# Patient Record
Sex: MALE | Race: BLACK OR AFRICAN AMERICAN | NOT HISPANIC OR LATINO | Employment: FULL TIME | URBAN - METROPOLITAN AREA
[De-identification: names, ages, dates, MRNs, and addresses within clinical notes are randomized per-mention and may not be internally consistent; named-entity substitution may affect disease eponyms.]

---

## 2018-06-19 ENCOUNTER — OFFICE VISIT (OUTPATIENT)
Dept: INTERNAL MEDICINE | Facility: CLINIC | Age: 48
End: 2018-06-19
Payer: COMMERCIAL

## 2018-06-19 VITALS
DIASTOLIC BLOOD PRESSURE: 78 MMHG | BODY MASS INDEX: 35.41 KG/M2 | WEIGHT: 261.44 LBS | HEIGHT: 72 IN | SYSTOLIC BLOOD PRESSURE: 110 MMHG | HEART RATE: 74 BPM

## 2018-06-19 DIAGNOSIS — M79.604 BILATERAL LOWER EXTREMITY PAIN: Primary | ICD-10-CM

## 2018-06-19 DIAGNOSIS — M79.605 BILATERAL LOWER EXTREMITY PAIN: Primary | ICD-10-CM

## 2018-06-19 PROCEDURE — 99213 OFFICE O/P EST LOW 20 MIN: CPT | Mod: S$GLB,,, | Performed by: PHYSICIAN ASSISTANT

## 2018-06-19 PROCEDURE — 3008F BODY MASS INDEX DOCD: CPT | Mod: CPTII,S$GLB,, | Performed by: PHYSICIAN ASSISTANT

## 2018-06-19 PROCEDURE — 99999 PR PBB SHADOW E&M-EST. PATIENT-LVL IV: CPT | Mod: PBBFAC,,, | Performed by: PHYSICIAN ASSISTANT

## 2018-06-19 NOTE — PATIENT INSTRUCTIONS
Myalgias  Myalgias are another word for muscle aches and soreness. This is a symptom, not a disease. Myalgias can have many causes. A cold, the flu, or an acute infection can cause them. So can any illness with a high fever. They may happen after exertion (such as heavy exercise) or injury (such as an accident or fall). Some medicines (such as statins and certain antidepressants) can cause myalgias. They can also be a symptom of chronic or ongoing medical problems (such as lupus, chronic fatigue, or hypothyroidism). With these illnesses, other serious symptoms often occur in addition to muscle pain and soreness.    Myalgias most often go away on their own. If they don't go away, come back, or are severe, testing may be needed to help find the cause.  Home care  · Rest until you feel better.  · Follow instructions that you were given for how to care for yourself. This may depend on the cause of your myalgias.   · If myalgia is thought to be due to a medicine, be sure to talk to the doctor that prescribed the medicine about the best course of action.  · To control pain, take prescription or over-the-counter medicines as directed. Unless told not to, you can try acetaminophen or ibuprofen.  Follow-up care  Follow up with your healthcare provider or as advised. If your symptoms do not go away in a few days or if they come back, follow up with your healthcare provider for an exam and testing.  When to see medical advice  Call your healthcare provider for any of the following:  · Fever of 100.4°F (38ºC) or higher, or as directed by your healthcare provider  · Pain that gets worse and not better, or that goes away and comes back  · New joint pains  · New rash  · Severe headache, neck pain, drowsiness, or confusion  Date Last Reviewed: 3/1/2017  © 7329-4935 [employer]. [address] [place] [address], [place] [address]. All rights reserved. This information is not intended as a substitute for professional medical  care. Always follow your healthcare professional's instructions.

## 2018-06-19 NOTE — PROGRESS NOTES
Subjective:       Patient ID: Barthelemy Jolly is a 47 y.o. male.        Chief Complaint: Foot Pain (both pain=8); Leg Pain (both pain=8); and Headache    Barthelemy Jolly is an established patient of [facility] here today for urgent care visit.    Bilateral lower extremity pain x 2 years or more, occurs most days, worsened pain over the past 6 months, typically 6-7/10, at worst 8-9/10, at best 4-5/10, pain never completely resolves, notes that he used to have back pain but not much of that anymore, no numbness or tingling, no weakness in the legs, pain all the way from thighs to lower legs and feet, pain gets worse after sitting and then tries to get up, eases once he gets up and moves around a little.  Last visit here was for same issue in 2016.  Had unremarkable lumbar x-ray and normal MILTON.      No abdominal pain.  No N/V/D/C.  No urinary sx.      Occasional headaches, no numbness/tingling/blurred vision/weakness.             Review of Systems   Constitutional: Negative for appetite change, chills, fatigue and fever.   HENT: Negative for congestion and sore throat.    Eyes: Negative for visual disturbance.   Respiratory: Negative for cough, chest tightness and shortness of breath.    Cardiovascular: Negative for chest pain, palpitations and leg swelling.   Gastrointestinal: Negative for abdominal pain, blood in stool, constipation, diarrhea, nausea and vomiting.   Genitourinary: Negative for dysuria, frequency, hematuria and urgency.   Musculoskeletal: Positive for arthralgias. Negative for back pain.   Skin: Negative for rash.   Neurological: Positive for headaches. Negative for dizziness, syncope and weakness.   Psychiatric/Behavioral: Negative for dysphoric mood and sleep disturbance. The patient is not nervous/anxious.        Objective:      Physical Exam   Constitutional: He appears well-developed and well-nourished. No distress.   HENT:   Head: Normocephalic and atraumatic.   Right Ear: Tympanic  membrane, external ear and ear canal normal.   Left Ear: Tympanic membrane, external ear and ear canal normal.   Nose: Nose normal.   Mouth/Throat: Oropharynx is clear and moist.   Eyes: Conjunctivae are normal. Pupils are equal, round, and reactive to light.   Cardiovascular: Normal rate, regular rhythm and normal heart sounds.  Exam reveals no gallop and no friction rub.    No murmur heard.  Pulmonary/Chest: Effort normal and breath sounds normal. No respiratory distress.   Abdominal: Soft. There is no tenderness. There is no rebound and no guarding.   Musculoskeletal: He exhibits no edema.        Lumbar back: He exhibits normal range of motion, no tenderness, no spasm and normal pulse.        Right upper leg: He exhibits no tenderness and no edema.        Left upper leg: He exhibits no tenderness and no edema.        Right lower leg: He exhibits no tenderness and no edema.        Left lower leg: He exhibits no tenderness and no edema.        Right foot: There is normal range of motion, no tenderness, no swelling and normal capillary refill.        Left foot: There is normal range of motion, no tenderness, no swelling and normal capillary refill.   Neurological: He is alert.   Skin: Skin is warm and dry. He is not diaphoretic.   Psychiatric: He has a normal mood and affect.   Nursing note and vitals reviewed.      Assessment:       1. Bilateral lower extremity pain        Plan:       Barthelemy was seen today for foot pain, leg pain and headache.    Diagnoses and all orders for this visit:    Bilateral lower extremity pain  -     Sedimentation rate; Future  -     C-reactive protein; Future  -     Rheumatoid factor; Future  -     ULYSSES; Future  -     Comprehensive metabolic panel; Future  -     TSH; Future  -     CK; Future  -     Ambulatory consult to Rheumatology    Discussed importance of establishing care with a PCP for ongoing evaluation of his BLE pain as this has been ongoing x 2 years and now worsening.   "Check labs as above, consult to rheum, and schedule appointment to establish care with PCP.      Pt has been given instructions populated from [employer] database and has verbalized understanding of the after visit summary and information contained wherein.    Follow up with a primary care provider. May go to ER for acute shortness of breath, lightheadedness, fever, or any other emergent complaints or changes in condition.    "This note will be shared with the patient"    Future Appointments  Date Time Provider Department Center   6/20/2018 1:30 PM Arabella Guardado MD [facility] IM [facility]   7/16/2018 1:00 PM Anthony De La Fuente MD [facility] RHEUM [facility]               "

## 2018-06-20 ENCOUNTER — OFFICE VISIT (OUTPATIENT)
Dept: INTERNAL MEDICINE | Facility: CLINIC | Age: 48
End: 2018-06-20
Payer: COMMERCIAL

## 2018-06-20 DIAGNOSIS — M54.5 LOW BACK PAIN, UNSPECIFIED BACK PAIN LATERALITY, UNSPECIFIED CHRONICITY, WITH SCIATICA PRESENCE UNSPECIFIED: ICD-10-CM

## 2018-06-20 DIAGNOSIS — Z13.89 SCREENING FOR MULTIPLE CONDITIONS: Primary | ICD-10-CM

## 2018-06-20 PROCEDURE — 99213 OFFICE O/P EST LOW 20 MIN: CPT | Mod: S$GLB,,, | Performed by: INTERNAL MEDICINE

## 2018-06-20 PROCEDURE — 3008F BODY MASS INDEX DOCD: CPT | Mod: CPTII,S$GLB,, | Performed by: INTERNAL MEDICINE

## 2018-06-20 PROCEDURE — 99999 PR PBB SHADOW E&M-EST. PATIENT-LVL III: CPT | Mod: PBBFAC,,, | Performed by: INTERNAL MEDICINE

## 2018-06-21 ENCOUNTER — HOSPITAL ENCOUNTER (OUTPATIENT)
Dept: RADIOLOGY | Facility: HOSPITAL | Age: 48
Discharge: HOME | End: 2018-06-21
Attending: INTERNAL MEDICINE
Payer: COMMERCIAL

## 2018-06-21 DIAGNOSIS — M54.5 LOW BACK PAIN, UNSPECIFIED BACK PAIN LATERALITY, UNSPECIFIED CHRONICITY, WITH SCIATICA PRESENCE UNSPECIFIED: ICD-10-CM

## 2018-06-21 PROCEDURE — 72148 MRI LUMBAR SPINE W/O DYE: CPT | Mod: 26,,, | Performed by: RADIOLOGY

## 2018-06-21 PROCEDURE — 72148 MRI LUMBAR SPINE W/O DYE: CPT | Mod: TC

## 2018-06-24 VITALS
SYSTOLIC BLOOD PRESSURE: 120 MMHG | OXYGEN SATURATION: 97 % | HEIGHT: 72 IN | BODY MASS INDEX: 35.21 KG/M2 | TEMPERATURE: 98 F | HEART RATE: 83 BPM | WEIGHT: 260 LBS | DIASTOLIC BLOOD PRESSURE: 72 MMHG

## 2018-06-24 NOTE — PROGRESS NOTES
Subjective:       Patient ID: Barthelemy Jolly is a 47 y.o. male.    Chief Complaint: Back Pain    HPI  He complains of lower back pain.  Denies any acute trauma.  No numbness, no weakness.  Pain has been present for over 6 weeks  Review of Systems   Constitutional: Negative for chills, fatigue, fever and unexpected weight change.   Respiratory: Negative for chest tightness and shortness of breath.    Cardiovascular: Negative for chest pain and palpitations.   Gastrointestinal: Negative for abdominal pain and blood in stool.   Neurological: Negative for dizziness, syncope, numbness and headaches.       Objective:      Physical Exam   HENT:   Right Ear: External ear normal.   Left Ear: External ear normal.   Nose: Nose normal.   Mouth/Throat: Oropharynx is clear and moist.   Eyes: Pupils are equal, round, and reactive to light.   Neck: Normal range of motion.   Cardiovascular: Normal rate and regular rhythm.    No murmur heard.  Pulmonary/Chest: Breath sounds normal.   Abdominal: He exhibits no distension. There is no hepatomegaly. There is no tenderness.   Lymphadenopathy:     He has no cervical adenopathy.     He has no axillary adenopathy.   Neurological: He has normal strength and normal reflexes. No cranial nerve deficit or sensory deficit.     lumbar spine without tenderness or redness  Assessment/Plan       Assessment and plan:  Lower back pain:  Schedule lumbar spine MRI.  Check lipid panel, CBC, PSA

## 2020-08-28 ENCOUNTER — OFFICE VISIT (OUTPATIENT)
Dept: INTERNAL MEDICINE | Facility: CLINIC | Age: 50
End: 2020-08-28
Payer: COMMERCIAL

## 2020-08-28 VITALS
SYSTOLIC BLOOD PRESSURE: 118 MMHG | WEIGHT: 273.38 LBS | DIASTOLIC BLOOD PRESSURE: 80 MMHG | HEIGHT: 72 IN | HEART RATE: 65 BPM | OXYGEN SATURATION: 97 % | BODY MASS INDEX: 37.03 KG/M2

## 2020-08-28 DIAGNOSIS — M25.562 CHRONIC PAIN OF BOTH KNEES: Primary | ICD-10-CM

## 2020-08-28 DIAGNOSIS — G89.29 CHRONIC PAIN OF BOTH KNEES: Primary | ICD-10-CM

## 2020-08-28 DIAGNOSIS — M54.12 CERVICAL RADICULOPATHY AT C5: ICD-10-CM

## 2020-08-28 DIAGNOSIS — M25.561 CHRONIC PAIN OF BOTH KNEES: Primary | ICD-10-CM

## 2020-08-28 PROCEDURE — 3008F BODY MASS INDEX DOCD: CPT | Mod: CPTII,S$GLB,, | Performed by: INTERNAL MEDICINE

## 2020-08-28 PROCEDURE — 3008F PR BODY MASS INDEX (BMI) DOCUMENTED: ICD-10-PCS | Mod: CPTII,S$GLB,, | Performed by: INTERNAL MEDICINE

## 2020-08-28 PROCEDURE — 99213 OFFICE O/P EST LOW 20 MIN: CPT | Mod: S$GLB,,, | Performed by: INTERNAL MEDICINE

## 2020-08-28 PROCEDURE — 99999 PR PBB SHADOW E&M-EST. PATIENT-LVL IV: CPT | Mod: PBBFAC,,, | Performed by: INTERNAL MEDICINE

## 2020-08-28 PROCEDURE — 99213 PR OFFICE/OUTPT VISIT, EST, LEVL III, 20-29 MIN: ICD-10-PCS | Mod: S$GLB,,, | Performed by: INTERNAL MEDICINE

## 2020-08-28 PROCEDURE — 99999 PR PBB SHADOW E&M-EST. PATIENT-LVL IV: ICD-10-PCS | Mod: PBBFAC,,, | Performed by: INTERNAL MEDICINE

## 2020-08-28 RX ORDER — MELOXICAM 15 MG/1
15 TABLET ORAL DAILY
Qty: 30 TABLET | Refills: 1 | Status: SHIPPED | OUTPATIENT
Start: 2020-08-28 | End: 2021-03-16

## 2020-08-28 NOTE — PROGRESS NOTES
Subjective:       Patient ID: Barthelemy Jolly is a 50 y.o. male.    Chief Complaint: Knee Pain and Arm Pain    50 year old former  complains of increasing pain in both knees and a tingling in his right arm that wakes him from sleep.  When awake the arm is totally normal in sensation, strength and ROM    Review of Systems   Constitutional: Negative for activity change, appetite change and fever.   HENT: Negative for congestion, postnasal drip and sore throat.    Respiratory: Negative for cough, shortness of breath and wheezing.    Cardiovascular: Negative for chest pain and palpitations.   Gastrointestinal: Negative for abdominal pain, blood in stool, constipation, diarrhea, nausea and vomiting.   Genitourinary: Negative for decreased urine volume, difficulty urinating, flank pain and frequency.   Musculoskeletal: Negative for arthralgias.   Neurological: Negative for dizziness, weakness and headaches.       Objective:      Physical Exam  Musculoskeletal:      Right shoulder: He exhibits normal range of motion, no tenderness, no deformity, no pain, no spasm and normal strength.      Right knee: He exhibits normal range of motion, no swelling, no effusion, no ecchymosis, no deformity, no laceration, normal alignment and no LCL laxity. No tenderness found.      Left knee: He exhibits normal range of motion, no swelling, no effusion, no deformity, no LCL laxity and no MCL laxity. No tenderness found.         Assessment:       1. Chronic pain of both knees    2. Cervical radiculopathy at C5        Plan:   Barthelemy was seen today for knee pain and arm pain.    Diagnoses and all orders for this visit:    Chronic pain of both knees  -     Ambulatory referral to Orthopedics; Future    Cervical radiculopathy at C5  -     Ambulatory referral/consult to [facility]; Future    Other orders  -     meloxicam (MOBIC) 15 MG tablet; Take 1 tablet (15 mg total) by mouth once daily.

## 2020-08-31 ENCOUNTER — HOSPITAL ENCOUNTER (OUTPATIENT)
Dept: RADIOLOGY | Facility: HOSPITAL | Age: 50
Discharge: HOME | End: 2020-08-31
Attending: PHYSICIAN ASSISTANT
Payer: COMMERCIAL

## 2020-08-31 ENCOUNTER — OFFICE VISIT (OUTPATIENT)
Dept: ORTHOPEDICS | Facility: CLINIC | Age: 50
End: 2020-08-31
Payer: COMMERCIAL

## 2020-08-31 VITALS
SYSTOLIC BLOOD PRESSURE: 118 MMHG | BODY MASS INDEX: 37.52 KG/M2 | DIASTOLIC BLOOD PRESSURE: 77 MMHG | HEART RATE: 73 BPM | WEIGHT: 277 LBS | TEMPERATURE: 98 F | HEIGHT: 72 IN

## 2020-08-31 DIAGNOSIS — M25.561 PAIN IN BOTH KNEES, UNSPECIFIED CHRONICITY: ICD-10-CM

## 2020-08-31 DIAGNOSIS — M25.562 PAIN IN BOTH KNEES, UNSPECIFIED CHRONICITY: Primary | ICD-10-CM

## 2020-08-31 DIAGNOSIS — M25.561 CHRONIC PAIN OF BOTH KNEES: ICD-10-CM

## 2020-08-31 DIAGNOSIS — M25.562 PAIN IN BOTH KNEES, UNSPECIFIED CHRONICITY: ICD-10-CM

## 2020-08-31 DIAGNOSIS — G89.29 CHRONIC PAIN OF BOTH KNEES: ICD-10-CM

## 2020-08-31 DIAGNOSIS — M25.562 CHRONIC PAIN OF BOTH KNEES: ICD-10-CM

## 2020-08-31 DIAGNOSIS — M17.11 OSTEOARTHRITIS OF RIGHT KNEE, UNSPECIFIED OSTEOARTHRITIS TYPE: ICD-10-CM

## 2020-08-31 DIAGNOSIS — M25.561 PAIN IN BOTH KNEES, UNSPECIFIED CHRONICITY: Primary | ICD-10-CM

## 2020-08-31 PROCEDURE — 73564 X-RAY EXAM KNEE 4 OR MORE: CPT | Mod: TC,50

## 2020-08-31 PROCEDURE — 99203 PR OFFICE/OUTPT VISIT, NEW, LEVL III, 30-44 MIN: ICD-10-PCS | Mod: S$GLB,,, | Performed by: PHYSICIAN ASSISTANT

## 2020-08-31 PROCEDURE — 3008F PR BODY MASS INDEX (BMI) DOCUMENTED: ICD-10-PCS | Mod: CPTII,S$GLB,, | Performed by: PHYSICIAN ASSISTANT

## 2020-08-31 PROCEDURE — 3008F BODY MASS INDEX DOCD: CPT | Mod: CPTII,S$GLB,, | Performed by: PHYSICIAN ASSISTANT

## 2020-08-31 PROCEDURE — 73564 XR KNEE ORTHO BILAT WITH FLEXION: ICD-10-PCS | Mod: 26,,, | Performed by: RADIOLOGY

## 2020-08-31 PROCEDURE — 99999 PR PBB SHADOW E&M-EST. PATIENT-LVL IV: ICD-10-PCS | Mod: PBBFAC,,, | Performed by: PHYSICIAN ASSISTANT

## 2020-08-31 PROCEDURE — 99999 PR PBB SHADOW E&M-EST. PATIENT-LVL IV: CPT | Mod: PBBFAC,,, | Performed by: PHYSICIAN ASSISTANT

## 2020-08-31 PROCEDURE — 99203 OFFICE O/P NEW LOW 30 MIN: CPT | Mod: S$GLB,,, | Performed by: PHYSICIAN ASSISTANT

## 2020-08-31 PROCEDURE — 73564 X-RAY EXAM KNEE 4 OR MORE: CPT | Mod: 26,,, | Performed by: RADIOLOGY

## 2020-08-31 NOTE — LETTER
August 31, 2020      Akiko Gillette MD  [address]  [facility] [address]           [facility] After [facility] 5th Floor  [address]  [facility] [address]  Phone: [phone]  Fax: [phone]          Patient: Barthelemy Jolly   MR Number: [id number]   YOB: 1970   Date of Visit: 8/31/2020       Dear Dr. Akiko Gillette:    Thank you for referring Barthelemy Jolly to me for evaluation. Attached you will find relevant portions of my assessment and plan of care.    If you have questions, please do not hesitate to call me. I look forward to following Barthelemy Jolly along with you.    Sincerely,    Elif Stuart PA-C    Enclosure  CC:  No Recipients    If you would like to receive this communication electronically, please contact [email or web address] or [phone] to request more information on [email or web address] access.    For providers and/or their staff who would like to refer a patient to [facility], please contact us through our one-stop-shop provider referral line, [facility], at [phone].    If you feel you have received this communication in error or would no longer like to receive these types of communications, please e-mail [email or web address]

## 2020-09-01 NOTE — PROGRESS NOTES
Subjective:      Patient ID: Barthelemy Jolly is a 50 y.o. male.    Chief Complaint: Pain of the Right Knee and Pain of the Left Knee    HPI  50 year old male presents with chief complaint of bilateral knee pain R>L x many years. He injured the right knee playing soccer and he had surgery about 12 years ago in [place]. Pain is medial. He has pain with squats and stairs. Sometimes he has pain with walking. He has taken Ibuprofen in the past which helped. He was recently prescribed mobic and says that has helped. He received cortisone injection about 2 years ago with no relief. He does not use assistive devices. He thinks the left knee has started to give him some pain due to him favoring the other knee.   Review of Systems   Constitution: Negative for chills, fever and night sweats.   Cardiovascular: Negative for chest pain.   Respiratory: Negative for cough and shortness of breath.    Hematologic/Lymphatic: Does not bruise/bleed easily.   Skin: Negative for color change.   Gastrointestinal: Negative for heartburn.   Genitourinary: Negative for dysuria.   Neurological: Negative for numbness and paresthesias.   Psychiatric/Behavioral: Negative for altered mental status.   Allergic/Immunologic: Negative for persistent infections.         Objective:            General    Vitals reviewed.  Constitutional: He is oriented to person, place, and time. He appears well-developed and well-nourished.   Cardiovascular: Normal rate.    Neurological: He is alert and oriented to person, place, and time.             Bilateral Knee Exam:  ROM 0-120 degrees  No effusion  No warmth or erythema  TTP medial joint line      X-ray: ordered and reviewed by myself. Mild DJD.  The medial tibiofemoral joint spaces narrowed more on the right side.  No fracture or dislocation.  No bone destruction identified.      Assessment:       Encounter Diagnoses   Name Primary?    Chronic pain of both knees     Pain in both knees, unspecified chronicity Yes     Osteoarthritis of right knee, unspecified osteoarthritis type           Plan:       Discussed treatment options with patient. He would like to try euflexxa injections. Order was placed. Continue mobic as needed. Order placed for PT. RTC in 2 weeks for first injection pending insurance approval.

## 2020-09-14 ENCOUNTER — TELEPHONE (OUTPATIENT)
Dept: ORTHOPEDICS | Facility: CLINIC | Age: 50
End: 2020-09-14

## 2020-09-14 NOTE — TELEPHONE ENCOUNTER
Called patient to see if they could come in earlier for their appt. The phone rings and then says the iván can not be completed as dialed.

## 2020-09-21 ENCOUNTER — OFFICE VISIT (OUTPATIENT)
Dept: ORTHOPEDICS | Facility: CLINIC | Age: 50
End: 2020-09-21
Payer: COMMERCIAL

## 2020-09-21 VITALS — HEIGHT: 72 IN | WEIGHT: 277.13 LBS | BODY MASS INDEX: 37.53 KG/M2

## 2020-09-21 DIAGNOSIS — M17.11 OSTEOARTHRITIS OF RIGHT KNEE, UNSPECIFIED OSTEOARTHRITIS TYPE: Primary | ICD-10-CM

## 2020-09-21 PROCEDURE — 20610 DRAIN/INJ JOINT/BURSA W/O US: CPT | Mod: RT,S$GLB,, | Performed by: PHYSICIAN ASSISTANT

## 2020-09-21 PROCEDURE — 99999 PR PBB SHADOW E&M-EST. PATIENT-LVL III: ICD-10-PCS | Mod: PBBFAC,,, | Performed by: PHYSICIAN ASSISTANT

## 2020-09-21 PROCEDURE — 99499 NO LOS: ICD-10-PCS | Mod: S$GLB,,, | Performed by: PHYSICIAN ASSISTANT

## 2020-09-21 PROCEDURE — 99999 PR PBB SHADOW E&M-EST. PATIENT-LVL III: CPT | Mod: PBBFAC,,, | Performed by: PHYSICIAN ASSISTANT

## 2020-09-21 PROCEDURE — 20610 PR DRAIN/INJECT LARGE JOINT/BURSA: ICD-10-PCS | Mod: RT,S$GLB,, | Performed by: PHYSICIAN ASSISTANT

## 2020-09-21 PROCEDURE — 99499 UNLISTED E&M SERVICE: CPT | Mod: S$GLB,,, | Performed by: PHYSICIAN ASSISTANT

## 2020-09-22 NOTE — PROGRESS NOTES
Subjective:      Patient ID: Barthelemy Jolly is a 50 y.o. male.    Chief Complaint: Pain and Injections of the Right Knee    HPI  Patient returns for the first of three Euflexxa injections right knee.    Review of Systems   Constitution: Negative for chills, fever and night sweats.   Cardiovascular: Negative for chest pain.   Respiratory: Negative for cough and shortness of breath.    Hematologic/Lymphatic: Does not bruise/bleed easily.   Skin: Negative for color change.   Gastrointestinal: Negative for heartburn.   Genitourinary: Negative for dysuria.   Neurological: Negative for numbness and paresthesias.   Psychiatric/Behavioral: Negative for altered mental status.   Allergic/Immunologic: Negative for persistent infections.         Objective:            Ortho/SPM Exam  The right knee is examined, there is no evidence of swelling or effusion.  There is no evidence of erythema. Skin, pulses, sensation are intact.            Assessment:       Encounter Diagnosis   Name Primary?    Osteoarthritis of right knee, unspecified osteoarthritis type Yes          Plan:       PROCEDURE:  I have explained the risks, benefits, and alternatives of the procedure in detail.  The patient voices understanding and all questions have been answered.  The patient agrees to proceed as planned. So after I performed a sterile prep of the skin in the normal fashion the right knee is injected using a 22 gauge needle from the anterolateral approach with 2cc of euflexxa solution. The patient is reminded that it can take 6 - 8 weeks to see all the affects of this treatment, they must complete all three injections to see all the affects and the treatment can not be repeated any earlier than six months.

## 2020-09-28 ENCOUNTER — TELEPHONE (OUTPATIENT)
Dept: ORTHOPEDICS | Facility: CLINIC | Age: 50
End: 2020-09-28

## 2020-09-28 ENCOUNTER — OFFICE VISIT (OUTPATIENT)
Dept: ORTHOPEDICS | Facility: CLINIC | Age: 50
End: 2020-09-28
Payer: COMMERCIAL

## 2020-09-28 VITALS — HEIGHT: 72 IN | WEIGHT: 277.13 LBS | BODY MASS INDEX: 37.53 KG/M2

## 2020-09-28 DIAGNOSIS — M17.11 PRIMARY OSTEOARTHRITIS OF RIGHT KNEE: Primary | ICD-10-CM

## 2020-09-28 PROCEDURE — 20610 PR DRAIN/INJECT LARGE JOINT/BURSA: ICD-10-PCS | Mod: RT,S$GLB,, | Performed by: PHYSICIAN ASSISTANT

## 2020-09-28 PROCEDURE — 99999 PR PBB SHADOW E&M-EST. PATIENT-LVL III: CPT | Mod: PBBFAC,,, | Performed by: PHYSICIAN ASSISTANT

## 2020-09-28 PROCEDURE — 99499 UNLISTED E&M SERVICE: CPT | Mod: S$GLB,,, | Performed by: PHYSICIAN ASSISTANT

## 2020-09-28 PROCEDURE — 99999 PR PBB SHADOW E&M-EST. PATIENT-LVL III: ICD-10-PCS | Mod: PBBFAC,,, | Performed by: PHYSICIAN ASSISTANT

## 2020-09-28 PROCEDURE — 99499 NO LOS: ICD-10-PCS | Mod: S$GLB,,, | Performed by: PHYSICIAN ASSISTANT

## 2020-09-28 PROCEDURE — 20610 DRAIN/INJ JOINT/BURSA W/O US: CPT | Mod: RT,S$GLB,, | Performed by: PHYSICIAN ASSISTANT

## 2020-09-28 NOTE — TELEPHONE ENCOUNTER
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 She had called him earlier to notify him that his [facility] appt would need to be re-scheduled due to [facility] being closed secondary to BOBBY Gann, not being here. Arabella said that patient was very angry. She told patient that if he could be on his way that BOBBY Tipton would be there unitl 5pm and could see him. When I spoke with him he said that he was at [employer], not at work, and could be on his way. Patient was belligerent and angry and said, "You are at a hospital. There are doctors everywhere. Somebody can be there if I'm a little late." I asked pt to hold and spoke with Tara Zhou, who said that she could stay a bit late to give pt his injection. I told pt that Tara could see him and that he should drive safely to get here.   "

## 2020-09-28 NOTE — TELEPHONE ENCOUNTER
Spoke with patient and explained that  had a family emergency and asked if he we could move his appt to Thursday, 10/1 at 7pm. Patient states that he took off today and did not want his appt moved. He wanted someone to be here at 7pm to give him his injection. Asked patient if he could be on his way, and he said no. He states that I expected him to drop what he was doing and to come now. Attempted to explain to the patient that there would be no one here after 5pm. He asked to speak with my supervisor. I spoke to Ms Cynthia and explained what was going on. She called the patient.

## 2020-09-28 NOTE — TELEPHONE ENCOUNTER
Called patient at 3:27 to see if we could move his appt in after hours to Thursday at 7pm because  had a family emergency. Phone rang and unable to leave a voicemail.

## 2020-09-29 NOTE — PROGRESS NOTES
Barthelemy Jolly presents to clinic today for the second right knee Euflexxa injection.    Exam demonstrates the no effusion in the  right knee, and the skin is intact.    Diagnosis: primary osteoarthritis right knee    After time out was performed and patient ID, side, and site were verified, the  right  knee was sterilly prepped in the standard fashion.  A 22-gauge needle was introduced into right knee joint from an miguelito-lateral site without complication and knee was then injected with 2 ml of Euflexxa.  Sterile dressing was applied.  The patient was instructed to resume activities as tolerated and to call with any problems.     We will see Barthelemy Jolly back next week for the third injection.

## 2020-10-05 ENCOUNTER — OFFICE VISIT (OUTPATIENT)
Dept: ORTHOPEDICS | Facility: CLINIC | Age: 50
End: 2020-10-05
Payer: COMMERCIAL

## 2020-10-05 VITALS — WEIGHT: 277.13 LBS | BODY MASS INDEX: 37.53 KG/M2 | HEIGHT: 72 IN

## 2020-10-05 DIAGNOSIS — M17.11 PRIMARY OSTEOARTHRITIS OF RIGHT KNEE: Primary | ICD-10-CM

## 2020-10-05 PROCEDURE — 99999 PR PBB SHADOW E&M-EST. PATIENT-LVL III: CPT | Mod: PBBFAC,,, | Performed by: PHYSICIAN ASSISTANT

## 2020-10-05 PROCEDURE — 99499 NO LOS: ICD-10-PCS | Mod: S$GLB,,, | Performed by: PHYSICIAN ASSISTANT

## 2020-10-05 PROCEDURE — 20610 PR DRAIN/INJECT LARGE JOINT/BURSA: ICD-10-PCS | Mod: RT,S$GLB,, | Performed by: PHYSICIAN ASSISTANT

## 2020-10-05 PROCEDURE — 99499 UNLISTED E&M SERVICE: CPT | Mod: S$GLB,,, | Performed by: PHYSICIAN ASSISTANT

## 2020-10-05 PROCEDURE — 20610 DRAIN/INJ JOINT/BURSA W/O US: CPT | Mod: RT,S$GLB,, | Performed by: PHYSICIAN ASSISTANT

## 2020-10-05 PROCEDURE — 99999 PR PBB SHADOW E&M-EST. PATIENT-LVL III: ICD-10-PCS | Mod: PBBFAC,,, | Performed by: PHYSICIAN ASSISTANT

## 2020-10-05 NOTE — PROGRESS NOTES
Subjective:      Patient ID: Barthelemy Jolly is a 50 y.o. male.    Chief Complaint: Pain and Injections of the Right Knee    HPI  Patient returns for the third of three. The patient tolerated the last injection well. The patient reports the Euflexxa injection in the right knee(s) has brought about no improvement..    Review of Systems   Constitution: Negative for chills, fever and night sweats.   Cardiovascular: Negative for chest pain.   Respiratory: Negative for cough and shortness of breath.    Hematologic/Lymphatic: Does not bruise/bleed easily.   Skin: Negative for color change.   Gastrointestinal: Negative for heartburn.   Genitourinary: Negative for dysuria.   Neurological: Negative for numbness and paresthesias.   Psychiatric/Behavioral: Negative for altered mental status.   Allergic/Immunologic: Negative for persistent infections.         Objective:            Ortho/SPM Exam  The right knee is examined, there is no evidence of swelling or effusion.  There is no evidence of erythema. Skin, pulses, sensation are intact.            Assessment:       Encounter Diagnosis   Name Primary?    Primary osteoarthritis of right knee Yes          Plan:       PROCEDURE:  I have explained the risks, benefits, and alternatives of the procedure in detail.  The patient voices understanding and all questions have been answered.  The patient agrees to proceed as planned. So after I performed a sterile prep of the skin in the normal fashion the right knee is injected using a 22 gauge needle from the anterolateral approach with 2cc of euflexxa solution. The patient is reminded that it can take 6 - 8 weeks to see all the affects of this treatment, they must complete all three injections to see all the affects and the treatment can not be repeated any earlier than six months.

## 2021-03-16 ENCOUNTER — OFFICE VISIT (OUTPATIENT)
Dept: FAMILY MEDICINE | Facility: CLINIC | Age: 51
End: 2021-03-16
Payer: COMMERCIAL

## 2021-03-16 ENCOUNTER — HOSPITAL ENCOUNTER (OUTPATIENT)
Dept: RADIOLOGY | Facility: HOSPITAL | Age: 51
Discharge: HOME | End: 2021-03-16
Attending: STUDENT IN AN ORGANIZED HEALTH CARE EDUCATION/TRAINING PROGRAM
Payer: COMMERCIAL

## 2021-03-16 VITALS
OXYGEN SATURATION: 97 % | DIASTOLIC BLOOD PRESSURE: 80 MMHG | HEART RATE: 91 BPM | BODY MASS INDEX: 37.93 KG/M2 | HEIGHT: 72 IN | WEIGHT: 280 LBS | SYSTOLIC BLOOD PRESSURE: 120 MMHG

## 2021-03-16 DIAGNOSIS — M54.9 DORSALGIA, UNSPECIFIED: ICD-10-CM

## 2021-03-16 DIAGNOSIS — Z00.00 WELL ADULT EXAM: Primary | ICD-10-CM

## 2021-03-16 DIAGNOSIS — R73.03 PREDIABETES: ICD-10-CM

## 2021-03-16 PROCEDURE — 99999 PR PBB SHADOW E&M-EST. PATIENT-LVL III: ICD-10-PCS | Mod: PBBFAC,,, | Performed by: STUDENT IN AN ORGANIZED HEALTH CARE EDUCATION/TRAINING PROGRAM

## 2021-03-16 PROCEDURE — 72100 X-RAY EXAM L-S SPINE 2/3 VWS: CPT | Mod: TC,FY,PO

## 2021-03-16 PROCEDURE — 1125F PR PAIN SEVERITY QUANTIFIED, PAIN PRESENT: ICD-10-PCS | Mod: S$GLB,,, | Performed by: STUDENT IN AN ORGANIZED HEALTH CARE EDUCATION/TRAINING PROGRAM

## 2021-03-16 PROCEDURE — 99214 PR OFFICE/OUTPT VISIT, EST, LEVL IV, 30-39 MIN: ICD-10-PCS | Mod: S$GLB,,, | Performed by: STUDENT IN AN ORGANIZED HEALTH CARE EDUCATION/TRAINING PROGRAM

## 2021-03-16 PROCEDURE — 72100 X-RAY EXAM L-S SPINE 2/3 VWS: CPT | Mod: 26,,, | Performed by: RADIOLOGY

## 2021-03-16 PROCEDURE — 72100 XR LUMBAR SPINE AP AND LATERAL: ICD-10-PCS | Mod: 26,,, | Performed by: RADIOLOGY

## 2021-03-16 PROCEDURE — 3008F PR BODY MASS INDEX (BMI) DOCUMENTED: ICD-10-PCS | Mod: CPTII,S$GLB,, | Performed by: STUDENT IN AN ORGANIZED HEALTH CARE EDUCATION/TRAINING PROGRAM

## 2021-03-16 PROCEDURE — 3008F BODY MASS INDEX DOCD: CPT | Mod: CPTII,S$GLB,, | Performed by: STUDENT IN AN ORGANIZED HEALTH CARE EDUCATION/TRAINING PROGRAM

## 2021-03-16 PROCEDURE — 99214 OFFICE O/P EST MOD 30 MIN: CPT | Mod: S$GLB,,, | Performed by: STUDENT IN AN ORGANIZED HEALTH CARE EDUCATION/TRAINING PROGRAM

## 2021-03-16 PROCEDURE — 99999 PR PBB SHADOW E&M-EST. PATIENT-LVL III: CPT | Mod: PBBFAC,,, | Performed by: STUDENT IN AN ORGANIZED HEALTH CARE EDUCATION/TRAINING PROGRAM

## 2021-03-16 PROCEDURE — 1125F AMNT PAIN NOTED PAIN PRSNT: CPT | Mod: S$GLB,,, | Performed by: STUDENT IN AN ORGANIZED HEALTH CARE EDUCATION/TRAINING PROGRAM

## 2021-03-16 RX ORDER — NAPROXEN 500 MG/1
500 TABLET ORAL 2 TIMES DAILY PRN
Qty: 60 TABLET | Refills: 0 | Status: SHIPPED | OUTPATIENT
Start: 2021-03-16 | End: 2021-04-08 | Stop reason: SDUPTHER

## 2021-03-16 RX ORDER — CYCLOBENZAPRINE HCL 5 MG
5 TABLET ORAL 3 TIMES DAILY PRN
Qty: 30 TABLET | Refills: 0 | Status: SHIPPED | OUTPATIENT
Start: 2021-03-16 | End: 2021-03-26

## 2021-03-18 DIAGNOSIS — M54.9 DORSALGIA, UNSPECIFIED: Primary | ICD-10-CM

## 2021-04-08 ENCOUNTER — HOSPITAL ENCOUNTER (EMERGENCY)
Facility: HOSPITAL | Age: 51
Discharge: HOME | End: 2021-04-08
Attending: EMERGENCY MEDICINE
Payer: COMMERCIAL

## 2021-04-08 VITALS
TEMPERATURE: 98 F | WEIGHT: 274 LBS | OXYGEN SATURATION: 96 % | SYSTOLIC BLOOD PRESSURE: 127 MMHG | BODY MASS INDEX: 35.16 KG/M2 | RESPIRATION RATE: 16 BRPM | HEIGHT: 74 IN | HEART RATE: 91 BPM | DIASTOLIC BLOOD PRESSURE: 75 MMHG

## 2021-04-08 DIAGNOSIS — M25.462 BILATERAL KNEE SWELLING: Primary | ICD-10-CM

## 2021-04-08 DIAGNOSIS — M54.9 DORSALGIA, UNSPECIFIED: ICD-10-CM

## 2021-04-08 DIAGNOSIS — M25.461 BILATERAL KNEE SWELLING: Primary | ICD-10-CM

## 2021-04-08 PROCEDURE — 99284 PR EMERGENCY DEPT VISIT,LEVEL IV: ICD-10-PCS | Mod: ,,, | Performed by: PHYSICIAN ASSISTANT

## 2021-04-08 PROCEDURE — 63600175 PHARM REV CODE 636 W HCPCS

## 2021-04-08 PROCEDURE — 99283 EMERGENCY DEPT VISIT LOW MDM: CPT

## 2021-04-08 PROCEDURE — 99284 EMERGENCY DEPT VISIT MOD MDM: CPT | Mod: ,,, | Performed by: PHYSICIAN ASSISTANT

## 2021-04-08 RX ORDER — KETOROLAC TROMETHAMINE 30 MG/ML
10 INJECTION, SOLUTION INTRAMUSCULAR; INTRAVENOUS
Status: COMPLETED | OUTPATIENT
Start: 2021-04-08 | End: 2021-04-08

## 2021-04-08 RX ORDER — KETOROLAC TROMETHAMINE 15 MG/ML
INJECTION, SOLUTION INTRAMUSCULAR; INTRAVENOUS
Status: COMPLETED
Start: 2021-04-08 | End: 2021-04-08

## 2021-04-08 RX ORDER — NAPROXEN 500 MG/1
500 TABLET ORAL 2 TIMES DAILY PRN
Qty: 60 TABLET | Refills: 0 | Status: SHIPPED | OUTPATIENT
Start: 2021-04-08 | End: 2021-05-08

## 2021-04-08 RX ADMIN — KETOROLAC TROMETHAMINE 15 MG: 15 INJECTION, SOLUTION INTRAMUSCULAR; INTRAVENOUS at 01:04

## 2021-04-14 ENCOUNTER — CLINICAL SUPPORT (OUTPATIENT)
Dept: REHABILITATION | Facility: HOSPITAL | Age: 51
End: 2021-04-14
Payer: COMMERCIAL

## 2021-04-14 DIAGNOSIS — G89.29 CHRONIC BILATERAL BACK PAIN, UNSPECIFIED BACK LOCATION: ICD-10-CM

## 2021-04-14 DIAGNOSIS — M54.9 CHRONIC BILATERAL BACK PAIN, UNSPECIFIED BACK LOCATION: ICD-10-CM

## 2021-04-14 DIAGNOSIS — M54.9 DORSALGIA, UNSPECIFIED: ICD-10-CM

## 2021-04-14 DIAGNOSIS — R53.1 DECREASED STRENGTH: ICD-10-CM

## 2021-04-14 DIAGNOSIS — M53.86 DECREASED ROM OF LUMBAR SPINE: ICD-10-CM

## 2021-04-14 PROCEDURE — 97110 THERAPEUTIC EXERCISES: CPT | Mod: PN

## 2021-04-14 PROCEDURE — 97161 PT EVAL LOW COMPLEX 20 MIN: CPT | Mod: PN

## 2021-04-15 ENCOUNTER — PATIENT MESSAGE (OUTPATIENT)
Dept: RESEARCH | Facility: HOSPITAL | Age: 51
End: 2021-04-15

## 2021-05-25 ENCOUNTER — DOCUMENTATION ONLY (OUTPATIENT)
Dept: REHABILITATION | Facility: HOSPITAL | Age: 51
End: 2021-05-25

## 2021-05-26 ENCOUNTER — CLINICAL SUPPORT (OUTPATIENT)
Dept: REHABILITATION | Facility: HOSPITAL | Age: 51
End: 2021-05-26
Payer: COMMERCIAL

## 2021-05-26 DIAGNOSIS — G89.29 CHRONIC BILATERAL BACK PAIN, UNSPECIFIED BACK LOCATION: ICD-10-CM

## 2021-05-26 DIAGNOSIS — M53.86 DECREASED ROM OF LUMBAR SPINE: ICD-10-CM

## 2021-05-26 DIAGNOSIS — R53.1 DECREASED STRENGTH: ICD-10-CM

## 2021-05-26 DIAGNOSIS — M54.9 CHRONIC BILATERAL BACK PAIN, UNSPECIFIED BACK LOCATION: ICD-10-CM

## 2021-05-26 PROCEDURE — 97110 THERAPEUTIC EXERCISES: CPT | Mod: PN

## 2021-05-26 PROCEDURE — 97140 MANUAL THERAPY 1/> REGIONS: CPT | Mod: PN

## 2021-06-14 ENCOUNTER — CLINICAL SUPPORT (OUTPATIENT)
Dept: REHABILITATION | Facility: HOSPITAL | Age: 51
End: 2021-06-14
Payer: COMMERCIAL

## 2021-06-14 DIAGNOSIS — M53.86 DECREASED ROM OF LUMBAR SPINE: ICD-10-CM

## 2021-06-14 DIAGNOSIS — R53.1 DECREASED STRENGTH: ICD-10-CM

## 2021-06-14 PROCEDURE — 97110 THERAPEUTIC EXERCISES: CPT | Mod: PN,CQ

## 2021-06-14 PROCEDURE — 97140 MANUAL THERAPY 1/> REGIONS: CPT | Mod: PN,CQ

## 2021-06-22 ENCOUNTER — PATIENT MESSAGE (OUTPATIENT)
Dept: FAMILY MEDICINE | Facility: CLINIC | Age: 51
End: 2021-06-22

## 2021-06-22 ENCOUNTER — DOCUMENTATION ONLY (OUTPATIENT)
Dept: REHABILITATION | Facility: HOSPITAL | Age: 51
End: 2021-06-22

## 2021-06-23 ENCOUNTER — TELEPHONE (OUTPATIENT)
Dept: REHABILITATION | Facility: HOSPITAL | Age: 51
End: 2021-06-23

## 2021-06-23 DIAGNOSIS — R53.1 DECREASED STRENGTH: ICD-10-CM

## 2021-06-23 DIAGNOSIS — M53.86 DECREASED ROM OF LUMBAR SPINE: Primary | ICD-10-CM

## 2021-06-24 ENCOUNTER — HOSPITAL ENCOUNTER (OUTPATIENT)
Dept: RADIOLOGY | Facility: HOSPITAL | Age: 51
Discharge: HOME | End: 2021-06-24
Attending: ORTHOPAEDIC SURGERY
Payer: COMMERCIAL

## 2021-06-24 ENCOUNTER — HOSPITAL ENCOUNTER (OUTPATIENT)
Dept: RADIOLOGY | Facility: HOSPITAL | Age: 51
Discharge: HOME | End: 2021-06-24
Attending: FAMILY MEDICINE
Payer: COMMERCIAL

## 2021-06-24 DIAGNOSIS — M25.559 HIP PAIN: Primary | ICD-10-CM

## 2021-06-24 DIAGNOSIS — M25.561 RIGHT KNEE PAIN: ICD-10-CM

## 2021-06-24 DIAGNOSIS — M25.559 HIP PAIN: ICD-10-CM

## 2021-06-24 DIAGNOSIS — M25.562 LEFT KNEE PAIN: Primary | ICD-10-CM

## 2021-06-24 DIAGNOSIS — M25.562 LEFT KNEE PAIN: ICD-10-CM

## 2021-06-24 PROCEDURE — 73521 X-RAY EXAM HIPS BI 2 VIEWS: CPT | Mod: TC

## 2021-06-24 PROCEDURE — 73521 XR HIPS BILATERAL 2 VIEW INCL AP PELVIS: ICD-10-PCS | Mod: 26,,, | Performed by: RADIOLOGY

## 2021-06-24 PROCEDURE — 73521 X-RAY EXAM HIPS BI 2 VIEWS: CPT | Mod: 26,,, | Performed by: RADIOLOGY

## 2021-06-30 ENCOUNTER — CLINICAL SUPPORT (OUTPATIENT)
Dept: REHABILITATION | Facility: HOSPITAL | Age: 51
End: 2021-06-30
Payer: COMMERCIAL

## 2021-06-30 DIAGNOSIS — R53.1 DECREASED STRENGTH: ICD-10-CM

## 2021-06-30 DIAGNOSIS — M54.9 BACK PAIN, UNSPECIFIED BACK LOCATION, UNSPECIFIED BACK PAIN LATERALITY, UNSPECIFIED CHRONICITY: ICD-10-CM

## 2021-06-30 DIAGNOSIS — M53.86 DECREASED ROM OF LUMBAR SPINE: ICD-10-CM

## 2021-06-30 PROCEDURE — 97140 MANUAL THERAPY 1/> REGIONS: CPT | Mod: PN

## 2021-06-30 PROCEDURE — 97110 THERAPEUTIC EXERCISES: CPT | Mod: PN

## 2021-07-19 ENCOUNTER — CLINICAL SUPPORT (OUTPATIENT)
Dept: REHABILITATION | Facility: HOSPITAL | Age: 51
End: 2021-07-19
Payer: COMMERCIAL

## 2021-07-19 DIAGNOSIS — M54.9 BACK PAIN, UNSPECIFIED BACK LOCATION, UNSPECIFIED BACK PAIN LATERALITY, UNSPECIFIED CHRONICITY: ICD-10-CM

## 2021-07-19 DIAGNOSIS — M53.86 DECREASED ROM OF LUMBAR SPINE: ICD-10-CM

## 2021-07-19 DIAGNOSIS — R53.1 DECREASED STRENGTH: ICD-10-CM

## 2021-07-19 PROCEDURE — 97140 MANUAL THERAPY 1/> REGIONS: CPT | Mod: PN

## 2021-07-19 PROCEDURE — 97110 THERAPEUTIC EXERCISES: CPT | Mod: PN

## 2021-07-20 ENCOUNTER — PATIENT OUTREACH (OUTPATIENT)
Dept: ADMINISTRATIVE | Facility: HOSPITAL | Age: 51
End: 2021-07-20

## 2021-07-20 DIAGNOSIS — Z12.12 ENCOUNTER FOR COLORECTAL CANCER SCREENING: Primary | ICD-10-CM

## 2021-07-20 DIAGNOSIS — Z12.11 ENCOUNTER FOR COLORECTAL CANCER SCREENING: Primary | ICD-10-CM

## 2021-07-27 ENCOUNTER — DOCUMENTATION ONLY (OUTPATIENT)
Dept: REHABILITATION | Facility: HOSPITAL | Age: 51
End: 2021-07-27

## 2021-08-23 ENCOUNTER — DOCUMENTATION ONLY (OUTPATIENT)
Dept: REHABILITATION | Facility: HOSPITAL | Age: 51
End: 2021-08-23

## 2021-10-08 DIAGNOSIS — Z01.818 PRE-OP TESTING: ICD-10-CM

## 2021-10-08 DIAGNOSIS — Z12.11 SPECIAL SCREENING FOR MALIGNANT NEOPLASMS, COLON: Primary | ICD-10-CM

## 2021-10-08 RX ORDER — POLYETHYLENE GLYCOL 3350, SODIUM SULFATE ANHYDROUS, SODIUM BICARBONATE, SODIUM CHLORIDE, POTASSIUM CHLORIDE 236; 22.74; 6.74; 5.86; 2.97 G/4L; G/4L; G/4L; G/4L; G/4L
4 POWDER, FOR SOLUTION ORAL ONCE
Qty: 4000 ML | Refills: 0 | Status: SHIPPED | OUTPATIENT
Start: 2021-10-08 | End: 2021-10-08

## 2021-10-28 ENCOUNTER — ANESTHESIA EVENT (OUTPATIENT)
Dept: ENDOSCOPY | Facility: HOSPITAL | Age: 51
End: 2021-10-28
Payer: COMMERCIAL

## 2021-10-28 ENCOUNTER — ANESTHESIA (OUTPATIENT)
Dept: ENDOSCOPY | Facility: HOSPITAL | Age: 51
End: 2021-10-28
Payer: COMMERCIAL

## 2021-10-28 ENCOUNTER — HOSPITAL ENCOUNTER (OUTPATIENT)
Facility: HOSPITAL | Age: 51
Discharge: HOME | End: 2021-10-28
Attending: INTERNAL MEDICINE | Admitting: INTERNAL MEDICINE
Payer: COMMERCIAL

## 2021-10-28 VITALS
TEMPERATURE: 98 F | BODY MASS INDEX: 34.01 KG/M2 | RESPIRATION RATE: 18 BRPM | HEIGHT: 74 IN | DIASTOLIC BLOOD PRESSURE: 79 MMHG | OXYGEN SATURATION: 95 % | WEIGHT: 265 LBS | SYSTOLIC BLOOD PRESSURE: 113 MMHG | HEART RATE: 61 BPM

## 2021-10-28 DIAGNOSIS — Z12.11 COLON CANCER SCREENING: Primary | ICD-10-CM

## 2021-10-28 PROCEDURE — 45380 COLONOSCOPY AND BIOPSY: CPT | Performed by: INTERNAL MEDICINE

## 2021-10-28 PROCEDURE — 27201012 HC FORCEPS, HOT/COLD, DISP: Performed by: INTERNAL MEDICINE

## 2021-10-28 PROCEDURE — 25000003 PHARM REV CODE 250: Performed by: NURSE ANESTHETIST, CERTIFIED REGISTERED

## 2021-10-28 PROCEDURE — 37000009 HC ANESTHESIA EA ADD 15 MINS: Performed by: INTERNAL MEDICINE

## 2021-10-28 PROCEDURE — 63600175 PHARM REV CODE 636 W HCPCS: Performed by: NURSE ANESTHETIST, CERTIFIED REGISTERED

## 2021-10-28 PROCEDURE — G0121 COLON CA SCRN NOT HI RSK IND: HCPCS | Mod: ,,, | Performed by: INTERNAL MEDICINE

## 2021-10-28 PROCEDURE — 37000008 HC ANESTHESIA 1ST 15 MINUTES: Performed by: INTERNAL MEDICINE

## 2021-10-28 PROCEDURE — G0121 COLON CA SCRN NOT HI RSK IND: ICD-10-PCS | Mod: ,,, | Performed by: INTERNAL MEDICINE

## 2021-10-28 PROCEDURE — E9220 PRA ENDO ANESTHESIA: HCPCS | Mod: ,,, | Performed by: NURSE ANESTHETIST, CERTIFIED REGISTERED

## 2021-10-28 PROCEDURE — 25000003 PHARM REV CODE 250: Performed by: INTERNAL MEDICINE

## 2021-10-28 PROCEDURE — E9220 PRA ENDO ANESTHESIA: ICD-10-PCS | Mod: ,,, | Performed by: NURSE ANESTHETIST, CERTIFIED REGISTERED

## 2021-10-28 PROCEDURE — G0121 COLON CA SCRN NOT HI RSK IND: HCPCS | Performed by: INTERNAL MEDICINE

## 2021-10-28 RX ORDER — PROPOFOL 10 MG/ML
VIAL (ML) INTRAVENOUS
Status: DISCONTINUED | OUTPATIENT
Start: 2021-10-28 | End: 2021-10-28

## 2021-10-28 RX ORDER — SODIUM CHLORIDE 9 MG/ML
INJECTION, SOLUTION INTRAVENOUS CONTINUOUS
Status: DISCONTINUED | OUTPATIENT
Start: 2021-10-28 | End: 2021-10-28 | Stop reason: HOSPADM

## 2021-10-28 RX ORDER — LIDOCAINE HCL/PF 100 MG/5ML
SYRINGE (ML) INTRAVENOUS
Status: DISCONTINUED | OUTPATIENT
Start: 2021-10-28 | End: 2021-10-28

## 2021-10-28 RX ORDER — HYDROMORPHONE HYDROCHLORIDE 2 MG/ML
0.5 INJECTION, SOLUTION INTRAMUSCULAR; INTRAVENOUS; SUBCUTANEOUS ONCE
Status: DISCONTINUED | OUTPATIENT
Start: 2021-10-28 | End: 2021-10-28

## 2021-10-28 RX ORDER — IBUPROFEN 400 MG/1
400 TABLET ORAL EVERY 6 HOURS PRN
COMMUNITY

## 2021-10-28 RX ORDER — PROPOFOL 10 MG/ML
VIAL (ML) INTRAVENOUS CONTINUOUS PRN
Status: DISCONTINUED | OUTPATIENT
Start: 2021-10-28 | End: 2021-10-28

## 2021-10-28 RX ADMIN — PROPOFOL 80 MG: 10 INJECTION, EMULSION INTRAVENOUS at 11:10

## 2021-10-28 RX ADMIN — SODIUM CHLORIDE: 0.9 INJECTION, SOLUTION INTRAVENOUS at 11:10

## 2021-10-28 RX ADMIN — GLYCOPYRROLATE 0.1 MG: 0.2 INJECTION, SOLUTION INTRAMUSCULAR; INTRAVITREAL at 11:10

## 2021-10-28 RX ADMIN — Medication 40 MG: at 11:10

## 2021-10-28 RX ADMIN — Medication 150 MCG/KG/MIN: at 11:10

## 2021-10-28 RX ADMIN — SODIUM CHLORIDE: 0.9 INJECTION, SOLUTION INTRAVENOUS at 10:10

## 2023-06-05 ENCOUNTER — HOSPITAL ENCOUNTER (EMERGENCY)
Facility: HOSPITAL | Age: 53
Discharge: HOME | End: 2023-06-05
Attending: EMERGENCY MEDICINE
Payer: COMMERCIAL

## 2023-06-05 VITALS
OXYGEN SATURATION: 95 % | TEMPERATURE: 99 F | WEIGHT: 265 LBS | DIASTOLIC BLOOD PRESSURE: 84 MMHG | BODY MASS INDEX: 34.01 KG/M2 | SYSTOLIC BLOOD PRESSURE: 129 MMHG | HEIGHT: 74 IN | HEART RATE: 62 BPM | RESPIRATION RATE: 14 BRPM

## 2023-06-05 DIAGNOSIS — M54.9 BACK PAIN: ICD-10-CM

## 2023-06-05 LAB
ALBUMIN SERPL BCP-MCNC: 4 G/DL (ref 3.5–5.2)
ALP SERPL-CCNC: 93 U/L (ref 55–135)
ALT SERPL W/O P-5'-P-CCNC: 21 U/L (ref 10–44)
ANION GAP SERPL CALC-SCNC: 10 MMOL/L (ref 8–16)
AST SERPL-CCNC: 17 U/L (ref 10–40)
BASOPHILS # BLD AUTO: 0.03 K/UL (ref 0–0.2)
BASOPHILS NFR BLD: 0.4 % (ref 0–1.9)
BILIRUB SERPL-MCNC: 0.2 MG/DL (ref 0.1–1)
BILIRUB UR QL STRIP: NEGATIVE
BNP SERPL-MCNC: 18 PG/ML (ref 0–99)
BUN SERPL-MCNC: 14 MG/DL (ref 6–20)
CALCIUM SERPL-MCNC: 9.3 MG/DL (ref 8.7–10.5)
CHLORIDE SERPL-SCNC: 107 MMOL/L (ref 95–110)
CLARITY UR REFRACT.AUTO: CLEAR
CO2 SERPL-SCNC: 21 MMOL/L (ref 23–29)
COLOR UR AUTO: YELLOW
CREAT SERPL-MCNC: 1 MG/DL (ref 0.5–1.4)
D DIMER PPP IA.FEU-MCNC: 0.21 MG/L FEU
DIFFERENTIAL METHOD: ABNORMAL
EOSINOPHIL # BLD AUTO: 0.1 K/UL (ref 0–0.5)
EOSINOPHIL NFR BLD: 1.8 % (ref 0–8)
ERYTHROCYTE [DISTWIDTH] IN BLOOD BY AUTOMATED COUNT: 13.5 % (ref 11.5–14.5)
EST. GFR  (NO RACE VARIABLE): >60 ML/MIN/1.73 M^2
GLUCOSE SERPL-MCNC: 82 MG/DL (ref 70–110)
GLUCOSE UR QL STRIP: NEGATIVE
HCT VFR BLD AUTO: 45 % (ref 40–54)
HCV AB SERPL QL IA: NORMAL
HGB BLD-MCNC: 14.5 G/DL (ref 14–18)
HGB UR QL STRIP: NEGATIVE
HIV 1+2 AB+HIV1 P24 AG SERPL QL IA: NORMAL
IMM GRANULOCYTES # BLD AUTO: 0.02 K/UL (ref 0–0.04)
IMM GRANULOCYTES NFR BLD AUTO: 0.3 % (ref 0–0.5)
KETONES UR QL STRIP: NEGATIVE
LEUKOCYTE ESTERASE UR QL STRIP: NEGATIVE
LYMPHOCYTES # BLD AUTO: 3.8 K/UL (ref 1–4.8)
LYMPHOCYTES NFR BLD: 48.3 % (ref 18–48)
MCH RBC QN AUTO: 26.7 PG (ref 27–31)
MCHC RBC AUTO-ENTMCNC: 32.2 G/DL (ref 32–36)
MCV RBC AUTO: 83 FL (ref 82–98)
MONOCYTES # BLD AUTO: 0.7 K/UL (ref 0.3–1)
MONOCYTES NFR BLD: 9.4 % (ref 4–15)
NEUTROPHILS # BLD AUTO: 3.2 K/UL (ref 1.8–7.7)
NEUTROPHILS NFR BLD: 39.8 % (ref 38–73)
NITRITE UR QL STRIP: NEGATIVE
NRBC BLD-RTO: 0 /100 WBC
PH UR STRIP: 5 [PH] (ref 5–8)
PLATELET # BLD AUTO: 196 K/UL (ref 150–450)
PMV BLD AUTO: 9.9 FL (ref 9.2–12.9)
POTASSIUM SERPL-SCNC: 4.1 MMOL/L (ref 3.5–5.1)
PROT SERPL-MCNC: 7.6 G/DL (ref 6–8.4)
PROT UR QL STRIP: NEGATIVE
RBC # BLD AUTO: 5.43 M/UL (ref 4.6–6.2)
SODIUM SERPL-SCNC: 138 MMOL/L (ref 136–145)
SP GR UR STRIP: 1.02 (ref 1–1.03)
TROPONIN I SERPL DL<=0.01 NG/ML-MCNC: <0.006 NG/ML (ref 0–0.03)
URN SPEC COLLECT METH UR: NORMAL
WBC # BLD AUTO: 7.91 K/UL (ref 3.9–12.7)

## 2023-06-05 PROCEDURE — 93010 EKG 12-LEAD: ICD-10-PCS | Mod: ,,, | Performed by: INTERNAL MEDICINE

## 2023-06-05 PROCEDURE — 87389 HIV-1 AG W/HIV-1&-2 AB AG IA: CPT | Performed by: PHYSICIAN ASSISTANT

## 2023-06-05 PROCEDURE — 99283 PR EMERGENCY DEPT VISIT,LEVEL III: ICD-10-PCS | Mod: ,,, | Performed by: EMERGENCY MEDICINE

## 2023-06-05 PROCEDURE — 84484 ASSAY OF TROPONIN QUANT: CPT | Mod: 91 | Performed by: EMERGENCY MEDICINE

## 2023-06-05 PROCEDURE — 83880 ASSAY OF NATRIURETIC PEPTIDE: CPT | Performed by: EMERGENCY MEDICINE

## 2023-06-05 PROCEDURE — 99283 EMERGENCY DEPT VISIT LOW MDM: CPT | Mod: ,,, | Performed by: EMERGENCY MEDICINE

## 2023-06-05 PROCEDURE — 80053 COMPREHEN METABOLIC PANEL: CPT | Performed by: EMERGENCY MEDICINE

## 2023-06-05 PROCEDURE — 93010 ELECTROCARDIOGRAM REPORT: CPT | Mod: ,,, | Performed by: INTERNAL MEDICINE

## 2023-06-05 PROCEDURE — 85025 COMPLETE CBC W/AUTO DIFF WBC: CPT | Performed by: EMERGENCY MEDICINE

## 2023-06-05 PROCEDURE — 25000003 PHARM REV CODE 250: Performed by: EMERGENCY MEDICINE

## 2023-06-05 PROCEDURE — 81003 URINALYSIS AUTO W/O SCOPE: CPT | Performed by: EMERGENCY MEDICINE

## 2023-06-05 PROCEDURE — 99285 EMERGENCY DEPT VISIT HI MDM: CPT | Mod: 25

## 2023-06-05 PROCEDURE — 85379 FIBRIN DEGRADATION QUANT: CPT | Performed by: EMERGENCY MEDICINE

## 2023-06-05 PROCEDURE — 86803 HEPATITIS C AB TEST: CPT | Performed by: PHYSICIAN ASSISTANT

## 2023-06-05 PROCEDURE — 93005 ELECTROCARDIOGRAM TRACING: CPT

## 2023-06-05 RX ORDER — ASPIRIN 325 MG
325 TABLET ORAL
Status: COMPLETED | OUTPATIENT
Start: 2023-06-05 | End: 2023-06-05

## 2023-06-05 RX ORDER — ACETAMINOPHEN 500 MG
1000 TABLET ORAL
Status: COMPLETED | OUTPATIENT
Start: 2023-06-05 | End: 2023-06-05

## 2023-06-05 RX ORDER — LIDOCAINE 50 MG/G
1 PATCH TOPICAL DAILY PRN
Qty: 15 PATCH | Refills: 0 | Status: SHIPPED | OUTPATIENT
Start: 2023-06-05

## 2023-06-05 RX ORDER — ACETAMINOPHEN 500 MG
500 TABLET ORAL EVERY 6 HOURS PRN
Qty: 20 TABLET | Refills: 0 | Status: SHIPPED | OUTPATIENT
Start: 2023-06-05

## 2023-06-05 RX ADMIN — ASPIRIN 325 MG ORAL TABLET 325 MG: 325 PILL ORAL at 06:06

## 2023-06-05 RX ADMIN — ACETAMINOPHEN 1000 MG: 500 TABLET ORAL at 06:06

## 2023-06-05 NOTE — ED PROVIDER NOTES
Encounter Date: 6/5/2023       History     Chief Complaint   Patient presents with    Flank Pain     Pain to r flank gissel/ lower rib, and goes to my stomach     51 yo male presenting with right upper back, rib pain.    PMH:  Prediabetes, back pain    Context:  Patient denies trauma to the area or heavy lifting.  He is experienced similar pain previously.  Symptoms began last night, but worsened this evening.  Triage note reviewed, patient denies abdominal pain or radiation to his abdomen.  He has no postprandial symptoms.   Onset:  Gradual  Location:  Right upper back, radiates around right ribs to friend of chest  Duration:  Since last night  Modifiers:  He took Advil last night with some improvement, symptoms improve if he lays flat and does move  Associated Symptoms:  Denies nausea vomiting       The history is provided by the patient and medical records. No  was used.   Review of patient's allergies indicates:  No Known Allergies  No past medical history on file.  Past Surgical History:   Procedure Laterality Date    COLONOSCOPY N/A 10/28/2021    Procedure: COLONOSCOPY;  Surgeon: Massiel Nugent MD;  Location: [facility] ([address] [place] [address]);  Service: Endoscopy;  Laterality: N/A;  pt completed COVID vaccine- see Immunization record in chart-rb   10/25 arrival time confirmed with pt-rb    KNEE SURGERY       No family history on file.  Social History     Tobacco Use    Smoking status: Never    Smokeless tobacco: Never   Substance Use Topics    Alcohol use: No    Drug use: No     Review of Systems   Gastrointestinal:  Negative for abdominal pain.   Genitourinary:  Negative for flank pain and hematuria.   Musculoskeletal:  Positive for back pain (Right upper back).   Skin:  Negative for rash.     Physical Exam     Initial Vitals [06/05/23 1802]   BP Pulse Resp Temp SpO2   133/86 69 18 98.4 °F (36.9 °C) 97 %      MAP       --         Physical Exam    Nursing note and vitals reviewed.  Constitutional: He is not  diaphoretic. No distress.   HENT:   Head: Normocephalic and atraumatic.   Eyes: Right eye exhibits no discharge. Left eye exhibits no discharge.   Neck: Neck supple. No tracheal deviation present. No JVD present.   Cardiovascular:  Normal rate, regular rhythm, normal heart sounds and intact distal pulses.     Exam reveals no gallop and no friction rub.       No murmur heard.  Pulmonary/Chest: Breath sounds normal. No respiratory distress.           Location of pain, non-tender, radiates around right ribs to front of chest  No vesicular rash    Abdominal: Abdomen is soft. There is no abdominal tenderness.   No right CVA tenderness.  No left CVA tenderness. There is no tenderness at McBurney's point and negative Arshad's sign.   Musculoskeletal:         General: No edema.      Cervical back: Neck supple.      Comments: No C/T/L midline spinal TTP     Neurological: He is alert and oriented to person, place, and time.   Skin: Skin is warm. No rash noted.   Psychiatric: He has a normal mood and affect. His behavior is normal.       ED Course   Procedures  Labs Reviewed   HIV 1 / 2 ANTIBODY   HEPATITIS C ANTIBODY     EKG Readings: (Independently Interpreted)   Initial Reading: No STEMI. Previous EKG: Compared with most recent EKG Previous EKG Date: 3/26/2015. Rhythm: Normal Sinus Rhythm. Heart Rate: 66. Clinical Impression: Normal Sinus Rhythm   TWI lead III, similar to prior      Imaging Results    None          Medications - No data to display  Medical Decision Making:   History:   Old Medical Records: I decided to obtain old medical records.  Old Records Summarized: other records.       <> Summary of Records: XR T/L Spine 2022:    There is a 7 degree levocurvature of the lower thoracic and upper lumbar spine and a compensatory 5 degree dextrocurvature of the upper thoracic spine.  Alignment is is otherwise within normal limits. Vertebral body heights are preserved. Paravertebral soft tissues are within normal  limits.  Initial Assessment:   Urgent evaluation of a 52-year-old male presenting with atraumatic right upper back pain radiating to the front of his chest.  On arrival he is afebrile, nontender abdomen.  No distress.  Differential Diagnosis:   Including, but not limited to:    ACS  PE  Considered aortic pathology  Musculoskeletal pain  Independently Interpreted Test(s):   I have ordered and independently interpreted X-rays - see summary below.  I have ordered and independently interpreted EKG Reading(s) - see prior notes  Clinical Tests:   Lab Tests: Ordered and Reviewed  Radiological Study: Reviewed and Ordered  Medical Tests: Reviewed and Ordered  ED Management:  Lower suspicion for ACS, EKG without acute ischemic changes, troponin negative x2.  Lower clinical suspicion for PE, no hypoxia or tachycardia, screening D-dimer negative.  Also considered but do not suspect aortic pathology -  lower clinical suspicion, intact and equal distal pulses, negative D-dimer.  UA without RBCs or evidence of infection, lower suspicion for nephrolithiasis.  Favor musculoskeletal pain/strain.  Discussed conservative management and close PCP follow-up.  Patient felt per prior to discharge.  All questions answered prior to discharge.  Patient understands and agrees with the plan.  Return precautions given.      ED Diagnoses:  Upper back pain           ED Course as of 06/06/23 1107   Mon Jun 05, 2023   1856 D-Dimer: 0.21  Normal  [AB]   1856 WBC: 7.91  No leukocytosis  [AB]   1931 Troponin I: <0.006  Normal  [AB]   1931 AST: 17 [AB]   1931 ALT: 21  Normal LFTs [AB]      ED Course User Index  [AB] Jm Ruvalcaba MD                 Clinical Impression:   Final diagnoses:  [M54.9] Back pain               Jm Ruvalcaba MD  06/06/23 1110

## 2024-04-22 ENCOUNTER — OFFICE VISIT (OUTPATIENT)
Dept: URGENT CARE | Facility: CLINIC | Age: 54
End: 2024-04-22
Payer: COMMERCIAL

## 2024-04-22 VITALS
SYSTOLIC BLOOD PRESSURE: 115 MMHG | RESPIRATION RATE: 18 BRPM | OXYGEN SATURATION: 96 % | DIASTOLIC BLOOD PRESSURE: 82 MMHG | BODY MASS INDEX: 34.01 KG/M2 | TEMPERATURE: 100 F | HEIGHT: 74 IN | HEART RATE: 91 BPM | WEIGHT: 265 LBS

## 2024-04-22 DIAGNOSIS — R09.81 NASAL CONGESTION: ICD-10-CM

## 2024-04-22 DIAGNOSIS — R05.9 COUGH, UNSPECIFIED TYPE: ICD-10-CM

## 2024-04-22 DIAGNOSIS — U07.1 COVID-19 VIRUS DETECTED: ICD-10-CM

## 2024-04-22 DIAGNOSIS — U07.1 COVID-19: Primary | ICD-10-CM

## 2024-04-22 LAB
CTP QC/QA: YES
CTP QC/QA: YES
POC MOLECULAR INFLUENZA A AGN: NEGATIVE
POC MOLECULAR INFLUENZA B AGN: NEGATIVE
SARS-COV-2 AG RESP QL IA.RAPID: POSITIVE

## 2024-04-22 PROCEDURE — 87502 INFLUENZA DNA AMP PROBE: CPT | Mod: QW,S$GLB,, | Performed by: NURSE PRACTITIONER

## 2024-04-22 PROCEDURE — 99204 OFFICE O/P NEW MOD 45 MIN: CPT | Mod: S$GLB,,, | Performed by: NURSE PRACTITIONER

## 2024-04-22 PROCEDURE — 87811 SARS-COV-2 COVID19 W/OPTIC: CPT | Mod: QW,S$GLB,, | Performed by: NURSE PRACTITIONER

## 2024-04-22 RX ORDER — PROMETHAZINE HYDROCHLORIDE AND DEXTROMETHORPHAN HYDROBROMIDE 6.25; 15 MG/5ML; MG/5ML
5 SYRUP ORAL EVERY 8 HOURS PRN
Qty: 118 ML | Refills: 0 | Status: SHIPPED | OUTPATIENT
Start: 2024-04-22 | End: 2024-05-01

## 2024-04-22 RX ORDER — IPRATROPIUM BROMIDE 21 UG/1
1 SPRAY, METERED NASAL 2 TIMES DAILY
Qty: 30 ML | Refills: 0 | Status: SHIPPED | OUTPATIENT
Start: 2024-04-22 | End: 2024-04-29

## 2024-04-22 NOTE — PROGRESS NOTES
"Subjective:      Patient ID: Barthelemy Jolly is a 53 y.o. male.    Vitals:  height is 6' 2.02" (1.88 m) and weight is 120.2 kg (265 lb). His oral temperature is 100.1 °F (37.8 °C). His blood pressure is 115/82 and his pulse is 91. His respiration is 18 and oxygen saturation is 96%.     Chief Complaint: Cough    This is a 53 y.o. male who presents today with a chief complaint of cough, headaches, body aches, fever and fatigue. Patient states he started to feel bad on Friday and it has progressively gotten worse.     No difficulty breathing or shortness of breath.  No known sick contacts.    Cough  This is a new problem. The current episode started in the past 7 days. The problem has been gradually worsening. The problem occurs constantly. The cough is Non-productive. Associated symptoms include a fever and headaches. Nothing aggravates the symptoms. Treatments tried: mucinex. The treatment provided no relief.       Constitution: Positive for fever.   Respiratory:  Positive for cough.    Neurological:  Positive for headaches.      Objective:     Physical Exam   Constitutional: He is oriented to person, place, and time. He appears well-developed. He is cooperative.  Non-toxic appearance. He appears ill. No distress.   HENT:   Head: Normocephalic and atraumatic.   Ears:   Right Ear: Hearing and external ear normal.   Left Ear: Hearing and external ear normal.   Nose: Rhinorrhea present. No mucosal edema or nasal deformity. No epistaxis.   Mouth/Throat: Oropharynx is clear and moist and mucous membranes are normal. Cobblestoning present.   Eyes: Conjunctivae and lids are normal. No scleral icterus.   Neck: Trachea normal and phonation normal. Neck supple. No edema present. No erythema present. No neck rigidity present.   Cardiovascular: Normal rate, regular rhythm, normal heart sounds and normal pulses.   Pulmonary/Chest: Effort normal and breath sounds normal. No respiratory distress. He has no decreased breath sounds. " He has no rhonchi.   Abdominal: Normal appearance.   Musculoskeletal: Normal range of motion.         General: No deformity. Normal range of motion.   Neurological: He is alert and oriented to person, place, and time. He exhibits normal muscle tone. Coordination normal.   Skin: Skin is warm, dry, intact, not diaphoretic and not pale.   Psychiatric: His speech is normal and behavior is normal. Judgment and thought content normal.   Nursing note and vitals reviewed.    Results for orders placed or performed in visit on 04/22/24   SARS Coronavirus 2 Antigen, POCT Manual Read   Result Value Ref Range    SARS Coronavirus 2 Antigen Positive (A) Negative     Acceptable Yes    POCT Influenza A/B MOLECULAR   Result Value Ref Range    POC Molecular Influenza A Ag Negative Negative    POC Molecular Influenza B Ag Negative Negative     Acceptable Yes        Assessment:     1. COVID-19    2. Cough, unspecified type    3. Nasal congestion        Plan:   Labs ordered at this visit reviewed.     I explained the quarantine process per CDC guidelines and patient acknowledged complete understanding and agrees to plan.    I discussed the antiviral medication Paxlovid with the patient.  The patient declined this medication at this time.      COVID-19    Cough, unspecified type  -     SARS Coronavirus 2 Antigen, POCT Manual Read  -     POCT Influenza A/B MOLECULAR  -     promethazine-dextromethorphan (PROMETHAZINE-DM) 6.25-15 mg/5 mL Syrp; Take 5 mLs by mouth every 8 (eight) hours as needed (cough).  Dispense: 118 mL; Refill: 0    Nasal congestion  -     ipratropium (ATROVENT) 21 mcg (0.03 %) nasal spray; 1 spray by Each Nostril route 2 (two) times daily. for 7 days  Dispense: 30 mL; Refill: 0

## 2024-04-22 NOTE — LETTER
[address] [facility]  [place] [address]  Phone: [phone]  Fax: [phone]          Return to Work/School    Patient: Barthelemy Jolly  YOB: 1970   Date: 04/22/2024     To Whom It May Concern:     Barthelemy Jolly was in contact with/seen in my office on 04/22/2024. COVID-19 is present in our communities across the state. There is limited testing for COVID at this time, so not all patients can be tested. In this situation, your employee meets the following criteria:     Barthelemy Jolly has met the criteria for COVID-19 testing and has a POSITIVE result. He can return to work once they are asymptomatic for 24 hours without the use of fever reducing medications AND at least five days from the start of symptoms (or from the first positive result if they have no symptoms).      If you have any questions or concerns, or if I can be of further assistance, please do not hesitate to contact me.     Sincerely,      Conor Galdamez III, NP

## 2024-04-23 ENCOUNTER — PATIENT MESSAGE (OUTPATIENT)
Dept: RESEARCH | Facility: HOSPITAL | Age: 54
End: 2024-04-23
Payer: COMMERCIAL

## 2024-04-29 ENCOUNTER — NURSE TRIAGE (OUTPATIENT)
Dept: ADMINISTRATIVE | Facility: CLINIC | Age: 54
End: 2024-04-29
Payer: COMMERCIAL

## 2024-04-29 ENCOUNTER — HOSPITAL ENCOUNTER (EMERGENCY)
Facility: HOSPITAL | Age: 54
Discharge: HOME | End: 2024-04-29
Attending: STUDENT IN AN ORGANIZED HEALTH CARE EDUCATION/TRAINING PROGRAM
Payer: COMMERCIAL

## 2024-04-29 VITALS
HEART RATE: 99 BPM | WEIGHT: 265 LBS | OXYGEN SATURATION: 94 % | SYSTOLIC BLOOD PRESSURE: 126 MMHG | DIASTOLIC BLOOD PRESSURE: 73 MMHG | RESPIRATION RATE: 20 BRPM | HEIGHT: 74 IN | BODY MASS INDEX: 34.01 KG/M2 | TEMPERATURE: 99 F

## 2024-04-29 DIAGNOSIS — J32.9 SINUSITIS, UNSPECIFIED CHRONICITY, UNSPECIFIED LOCATION: ICD-10-CM

## 2024-04-29 DIAGNOSIS — U07.1 COVID-19: ICD-10-CM

## 2024-04-29 DIAGNOSIS — J06.9 URI WITH COUGH AND CONGESTION: Primary | ICD-10-CM

## 2024-04-29 DIAGNOSIS — R06.00 DYSPNEA: ICD-10-CM

## 2024-04-29 LAB
ALBUMIN SERPL BCP-MCNC: 3.9 G/DL (ref 3.5–5.2)
ALLENS TEST: ABNORMAL
ALP SERPL-CCNC: 111 U/L (ref 55–135)
ALT SERPL W/O P-5'-P-CCNC: 23 U/L (ref 10–44)
ANION GAP SERPL CALC-SCNC: 8 MMOL/L (ref 8–16)
AST SERPL-CCNC: 20 U/L (ref 10–40)
BASOPHILS # BLD AUTO: 0.03 K/UL (ref 0–0.2)
BASOPHILS NFR BLD: 0.3 % (ref 0–1.9)
BILIRUB SERPL-MCNC: 0.3 MG/DL (ref 0.1–1)
BNP SERPL-MCNC: 10 PG/ML (ref 0–99)
BUN SERPL-MCNC: 12 MG/DL (ref 6–20)
CALCIUM SERPL-MCNC: 9.6 MG/DL (ref 8.7–10.5)
CHLORIDE SERPL-SCNC: 107 MMOL/L (ref 95–110)
CK SERPL-CCNC: 158 U/L (ref 20–200)
CO2 SERPL-SCNC: 25 MMOL/L (ref 23–29)
CREAT SERPL-MCNC: 1.1 MG/DL (ref 0.5–1.4)
CRP SERPL-MCNC: 35.2 MG/L (ref 0–8.2)
D DIMER PPP IA.FEU-MCNC: 0.42 MG/L FEU
DIFFERENTIAL METHOD BLD: ABNORMAL
EOSINOPHIL # BLD AUTO: 0.2 K/UL (ref 0–0.5)
EOSINOPHIL NFR BLD: 1.4 % (ref 0–8)
ERYTHROCYTE [DISTWIDTH] IN BLOOD BY AUTOMATED COUNT: 13.8 % (ref 11.5–14.5)
EST. GFR  (NO RACE VARIABLE): >60 ML/MIN/1.73 M^2
FERRITIN SERPL-MCNC: 324 NG/ML (ref 20–300)
GLUCOSE SERPL-MCNC: 84 MG/DL (ref 70–110)
HCT VFR BLD AUTO: 50.1 % (ref 40–54)
HGB BLD-MCNC: 15.5 G/DL (ref 14–18)
IMM GRANULOCYTES # BLD AUTO: 0.37 K/UL (ref 0–0.04)
IMM GRANULOCYTES NFR BLD AUTO: 3.3 % (ref 0–0.5)
LACTATE SERPL-SCNC: 3.4 MMOL/L (ref 0.5–2.2)
LDH SERPL L TO P-CCNC: 251 U/L (ref 110–260)
LDH SERPL L TO P-CCNC: 3.13 MMOL/L (ref 0.5–2.2)
LYMPHOCYTES # BLD AUTO: 2.7 K/UL (ref 1–4.8)
LYMPHOCYTES NFR BLD: 24.3 % (ref 18–48)
MCH RBC QN AUTO: 26.7 PG (ref 27–31)
MCHC RBC AUTO-ENTMCNC: 30.9 G/DL (ref 32–36)
MCV RBC AUTO: 86 FL (ref 82–98)
MONOCYTES # BLD AUTO: 0.8 K/UL (ref 0.3–1)
MONOCYTES NFR BLD: 7.5 % (ref 4–15)
NEUTROPHILS # BLD AUTO: 7.1 K/UL (ref 1.8–7.7)
NEUTROPHILS NFR BLD: 63.2 % (ref 38–73)
NRBC BLD-RTO: 0 /100 WBC
OHS QRS DURATION: 76 MS
OHS QTC CALCULATION: 372 MS
PLATELET # BLD AUTO: 220 K/UL (ref 150–450)
PMV BLD AUTO: 10.6 FL (ref 9.2–12.9)
POTASSIUM SERPL-SCNC: 4.2 MMOL/L (ref 3.5–5.1)
PROCALCITONIN SERPL IA-MCNC: 0.02 NG/ML
PROT SERPL-MCNC: 8.2 G/DL (ref 6–8.4)
RBC # BLD AUTO: 5.81 M/UL (ref 4.6–6.2)
SAMPLE: ABNORMAL
SARS-COV-2 RDRP RESP QL NAA+PROBE: NEGATIVE
SITE: ABNORMAL
SODIUM SERPL-SCNC: 140 MMOL/L (ref 136–145)
TROPONIN I SERPL DL<=0.01 NG/ML-MCNC: <0.006 NG/ML (ref 0–0.03)
WBC # BLD AUTO: 11.19 K/UL (ref 3.9–12.7)

## 2024-04-29 PROCEDURE — 82728 ASSAY OF FERRITIN: CPT | Performed by: STUDENT IN AN ORGANIZED HEALTH CARE EDUCATION/TRAINING PROGRAM

## 2024-04-29 PROCEDURE — 96374 THER/PROPH/DIAG INJ IV PUSH: CPT

## 2024-04-29 PROCEDURE — 84484 ASSAY OF TROPONIN QUANT: CPT | Performed by: STUDENT IN AN ORGANIZED HEALTH CARE EDUCATION/TRAINING PROGRAM

## 2024-04-29 PROCEDURE — 93005 ELECTROCARDIOGRAM TRACING: CPT

## 2024-04-29 PROCEDURE — 93010 ELECTROCARDIOGRAM REPORT: CPT | Mod: ,,, | Performed by: INTERNAL MEDICINE

## 2024-04-29 PROCEDURE — 82550 ASSAY OF CK (CPK): CPT | Performed by: STUDENT IN AN ORGANIZED HEALTH CARE EDUCATION/TRAINING PROGRAM

## 2024-04-29 PROCEDURE — 85379 FIBRIN DEGRADATION QUANT: CPT | Performed by: STUDENT IN AN ORGANIZED HEALTH CARE EDUCATION/TRAINING PROGRAM

## 2024-04-29 PROCEDURE — U0002 COVID-19 LAB TEST NON-CDC: HCPCS | Performed by: STUDENT IN AN ORGANIZED HEALTH CARE EDUCATION/TRAINING PROGRAM

## 2024-04-29 PROCEDURE — 94640 AIRWAY INHALATION TREATMENT: CPT

## 2024-04-29 PROCEDURE — 63600175 PHARM REV CODE 636 W HCPCS: Performed by: STUDENT IN AN ORGANIZED HEALTH CARE EDUCATION/TRAINING PROGRAM

## 2024-04-29 PROCEDURE — 83880 ASSAY OF NATRIURETIC PEPTIDE: CPT | Performed by: STUDENT IN AN ORGANIZED HEALTH CARE EDUCATION/TRAINING PROGRAM

## 2024-04-29 PROCEDURE — 83605 ASSAY OF LACTIC ACID: CPT

## 2024-04-29 PROCEDURE — 86140 C-REACTIVE PROTEIN: CPT | Performed by: STUDENT IN AN ORGANIZED HEALTH CARE EDUCATION/TRAINING PROGRAM

## 2024-04-29 PROCEDURE — 83615 LACTATE (LD) (LDH) ENZYME: CPT | Performed by: STUDENT IN AN ORGANIZED HEALTH CARE EDUCATION/TRAINING PROGRAM

## 2024-04-29 PROCEDURE — 85025 COMPLETE CBC W/AUTO DIFF WBC: CPT | Performed by: STUDENT IN AN ORGANIZED HEALTH CARE EDUCATION/TRAINING PROGRAM

## 2024-04-29 PROCEDURE — 27100098 HC SPACER

## 2024-04-29 PROCEDURE — 25000242 PHARM REV CODE 250 ALT 637 W/ HCPCS: Performed by: STUDENT IN AN ORGANIZED HEALTH CARE EDUCATION/TRAINING PROGRAM

## 2024-04-29 PROCEDURE — 83605 ASSAY OF LACTIC ACID: CPT | Performed by: STUDENT IN AN ORGANIZED HEALTH CARE EDUCATION/TRAINING PROGRAM

## 2024-04-29 PROCEDURE — 99900035 HC TECH TIME PER 15 MIN (STAT)

## 2024-04-29 PROCEDURE — 84145 PROCALCITONIN (PCT): CPT | Performed by: STUDENT IN AN ORGANIZED HEALTH CARE EDUCATION/TRAINING PROGRAM

## 2024-04-29 PROCEDURE — 96361 HYDRATE IV INFUSION ADD-ON: CPT

## 2024-04-29 PROCEDURE — 80053 COMPREHEN METABOLIC PANEL: CPT | Performed by: STUDENT IN AN ORGANIZED HEALTH CARE EDUCATION/TRAINING PROGRAM

## 2024-04-29 PROCEDURE — 99285 EMERGENCY DEPT VISIT HI MDM: CPT | Mod: 25

## 2024-04-29 PROCEDURE — 25000003 PHARM REV CODE 250: Performed by: STUDENT IN AN ORGANIZED HEALTH CARE EDUCATION/TRAINING PROGRAM

## 2024-04-29 RX ORDER — AMOXICILLIN AND CLAVULANATE POTASSIUM 875; 125 MG/1; MG/1
1 TABLET, FILM COATED ORAL 2 TIMES DAILY
Qty: 14 TABLET | Refills: 0 | Status: SHIPPED | OUTPATIENT
Start: 2024-04-29 | End: 2024-04-29

## 2024-04-29 RX ORDER — PSEUDOEPHEDRINE HCL 30 MG
60 TABLET ORAL
Status: COMPLETED | OUTPATIENT
Start: 2024-04-29 | End: 2024-04-29

## 2024-04-29 RX ORDER — PSEUDOEPHEDRINE HYDROCHLORIDE 60 MG/1
60 TABLET ORAL EVERY 12 HOURS PRN
Qty: 14 TABLET | Refills: 0 | Status: SHIPPED | OUTPATIENT
Start: 2024-04-29 | End: 2024-04-29

## 2024-04-29 RX ORDER — ALBUTEROL SULFATE 90 UG/1
2 AEROSOL, METERED RESPIRATORY (INHALATION) EVERY 6 HOURS PRN
Qty: 18 G | Refills: 0 | Status: SHIPPED | OUTPATIENT
Start: 2024-04-29 | End: 2024-04-29

## 2024-04-29 RX ORDER — GUAIFENESIN 600 MG/1
600 TABLET, EXTENDED RELEASE ORAL
Status: COMPLETED | OUTPATIENT
Start: 2024-04-29 | End: 2024-04-29

## 2024-04-29 RX ORDER — ALBUTEROL SULFATE 90 UG/1
2 AEROSOL, METERED RESPIRATORY (INHALATION) EVERY 6 HOURS PRN
Qty: 18 G | Refills: 0 | Status: SHIPPED | OUTPATIENT
Start: 2024-04-29 | End: 2024-05-01

## 2024-04-29 RX ORDER — ALBUTEROL SULFATE 90 UG/1
2 AEROSOL, METERED RESPIRATORY (INHALATION)
Status: COMPLETED | OUTPATIENT
Start: 2024-04-29 | End: 2024-04-29

## 2024-04-29 RX ORDER — ACETAMINOPHEN 325 MG/1
650 TABLET ORAL
Status: COMPLETED | OUTPATIENT
Start: 2024-04-29 | End: 2024-04-29

## 2024-04-29 RX ORDER — PSEUDOEPHEDRINE HYDROCHLORIDE 60 MG/1
60 TABLET ORAL EVERY 12 HOURS PRN
Qty: 14 TABLET | Refills: 0 | Status: SHIPPED | OUTPATIENT
Start: 2024-04-29 | End: 2024-05-01

## 2024-04-29 RX ORDER — AMOXICILLIN AND CLAVULANATE POTASSIUM 875; 125 MG/1; MG/1
1 TABLET, FILM COATED ORAL 2 TIMES DAILY
Qty: 14 TABLET | Refills: 0 | Status: SHIPPED | OUTPATIENT
Start: 2024-04-29 | End: 2024-05-01

## 2024-04-29 RX ORDER — AMOXICILLIN AND CLAVULANATE POTASSIUM 875; 125 MG/1; MG/1
1 TABLET, FILM COATED ORAL
Status: COMPLETED | OUTPATIENT
Start: 2024-04-29 | End: 2024-04-29

## 2024-04-29 RX ORDER — KETOROLAC TROMETHAMINE 30 MG/ML
15 INJECTION, SOLUTION INTRAMUSCULAR; INTRAVENOUS
Status: COMPLETED | OUTPATIENT
Start: 2024-04-29 | End: 2024-04-29

## 2024-04-29 RX ADMIN — SODIUM CHLORIDE, POTASSIUM CHLORIDE, SODIUM LACTATE AND CALCIUM CHLORIDE 1000 ML: 600; 310; 30; 20 INJECTION, SOLUTION INTRAVENOUS at 02:04

## 2024-04-29 RX ADMIN — GUAIFENESIN 600 MG: 600 TABLET, EXTENDED RELEASE ORAL at 01:04

## 2024-04-29 RX ADMIN — AMOXICILLIN AND CLAVULANATE POTASSIUM 1 TABLET: 875; 125 TABLET, FILM COATED ORAL at 04:04

## 2024-04-29 RX ADMIN — ALBUTEROL SULFATE 2 PUFF: 108 INHALANT RESPIRATORY (INHALATION) at 12:04

## 2024-04-29 RX ADMIN — ACETAMINOPHEN 650 MG: 325 TABLET ORAL at 04:04

## 2024-04-29 RX ADMIN — PSEUDOEPHEDRINE HCL 60 MG: 30 TABLET, FILM COATED ORAL at 01:04

## 2024-04-29 RX ADMIN — KETOROLAC TROMETHAMINE 15 MG: 30 INJECTION, SOLUTION INTRAMUSCULAR; INTRAVENOUS at 01:04

## 2024-04-29 NOTE — TELEPHONE ENCOUNTER
[facility] pt. Pt Covid positive 4/22/24.     Pt c/o having pain all over body, cough with green mucus, abdominal pain, weakness and constant chest pressure. Advised to go to ED per protocol. VU.   Reason for Disposition   SEVERE or constant chest pain or pressure  (Exception: Mild central chest pain, present only when coughing.)    Additional Information   Negative: SEVERE difficulty breathing (e.g., struggling for each breath, speaks in single words)   Negative: Difficult to awaken or acting confused (e.g., disoriented, slurred speech)   Negative: Bluish (or gray) lips or face now   Negative: Shock suspected (e.g., cold/pale/clammy skin, too weak to stand, low BP, rapid pulse)   Negative: Sounds like a life-threatening emergency to the triager    Protocols used: Coronavirus (COVID-19) Diagnosed or [id number]

## 2024-04-29 NOTE — ED PROVIDER NOTES
Source of History  patient, family, and EMR    Chief Complaint    URI (Covid ++ on the 22nd , still coughing and feeling feverish)      History of Present Illness    Barthelemy Jolly is a 53 y.o. male presenting with concern for persistent symptoms of respiratory infection, stating that he was COVID positive on April 22nd.  Still coughing and feeling subjective fevers, not taking his temperature however.    He was seen in urgent care April 22nd and was given nasal spray, and promethazine dextromethorphan but this is not assisting in any of his symptoms.  He was not taking his temperature so he does not know hives fever is getting.  He was concerned because the symptoms are persisting and sometimes getting worse.    Some chest discomfort and dyspnea with cough.    Review of Systems    As per HPI and below:  Constitutional symptoms:  Chills sweats and fever, generalized myalgias and fatigue  Skin symptoms:  No rash    Eye symptoms:  No blurred vision, no drainage  ENMT symptoms:  No sore throat, positive nasal congestion, rhinorrhea, and sinus pain and pressure  Respiratory symptoms:  + shortness of breath, positive cough, positive wheezing, no orthopnea, no dyspnea with exertion  Cardiovascular symptoms:  No chest pain, no leg swelling  Gastrointestinal symptoms:  No abdominal pain, no nausea, no vomiting  Musculoskeletal symptoms:  No back pain, generalized myalgias    Neurologic symptoms:  No headache      Past History    As per HPI and below:  No past medical history on file.    Past Surgical History:   Procedure Laterality Date    COLONOSCOPY N/A 10/28/2021    Procedure: COLONOSCOPY;  Surgeon: Massiel Nugent MD;  Location: [address]);  Service: Endoscopy;  Laterality: N/A;  pt completed COVID vaccine- see Immunization record in chart-rb   10/25 arrival time confirmed with pt-rb    KNEE SURGERY         Social History     Socioeconomic History    Marital status:    Tobacco Use    Smoking status: Never     Smokeless tobacco: Never   Substance and Sexual Activity    Alcohol use: No    Drug use: No       No family history on file.    Review of patient's allergies indicates:  No Known Allergies    No current facility-administered medications on file prior to encounter.     Current Outpatient Medications on File Prior to Encounter   Medication Sig Dispense Refill    acetaminophen (TYLENOL) 500 MG tablet Take 1 tablet (500 mg total) by mouth every 6 (six) hours as needed for Pain. (Patient not taking: Reported on 4/22/2024) 20 tablet 0    ibuprofen (ADVIL,MOTRIN) 400 MG tablet Take 400 mg by mouth every 6 (six) hours as needed for Other. (Patient not taking: Reported on 4/22/2024)      ipratropium (ATROVENT) 21 mcg (0.03 %) nasal spray 1 spray by Each Nostril route 2 (two) times daily. for 7 days 30 mL 0    LIDOcaine (LIDODERM) 5 % Place 1 patch onto the skin daily as needed (bak pain). Remove & Discard patch within 12 hours or as directed by MD (Patient not taking: Reported on 4/22/2024) 15 patch 0    promethazine-dextromethorphan (PROMETHAZINE-DM) 6.25-15 mg/5 mL Syrp Take 5 mLs by mouth every 8 (eight) hours as needed (cough). 118 mL 0       Physical Exam    Reviewed nursing notes.  Vitals:    04/29/24 1400 04/29/24 1500 04/29/24 1600 04/29/24 1630   BP: 134/74 131/84 133/81    Pulse: 102 110 (!) 112 106   Resp: 18 20 20 (!) 21   Temp:   (S) (!) 102.9 °F (39.4 °C)  Comment: Dr. Elam notified    TempSrc:   (S) Oral    SpO2: 95% 96% 95% (!) 94%   Weight:       Height:         General:  Alert, no acute distress.  Appears uncomfortable.  Skin:  Warm, dry, intact.  No rash.  Head:  Normocephalic, atraumatic.    Neck:  Supple.   HEENT:  Pupils are equal and round, appropriate for room, extraocular movements are intact.  Normal phonation.  Moist mucous membranes.  No oropharyngeal erythema or edema noted.  Boggy nasal turbinates.  Sinus pressure noted.  Cardiovascular:  Regular rate and rhythm, Normal peripheral perfusion,  No edema.    Respiratory:  Coarse breath sounds bilaterally.  Nonlabored.  Gastrointestinal:  Soft, Nontender, Non distended.   Back:  Nontender.   Musculoskeletal:  Normal range of motion observed.  Neurological:  Alert and oriented to person, place, time, and situation.  No focal deficits observed.   Psychiatric:  Cooperative, appropriate mood & affect.       Initial MDM and Data Review    53 y.o. male presenting for evaluation of persistent Respiratory symptoms of the setting of COVID diagnosis about 7 days prior to today.    Differential includes but is not limited to:  Persistent viral syndrome, secondary pneumonia, volume overload, doubt pulmonary embolism    Work-up includes:  CBC, CMP, troponin, lactic acid, dimer, BNP, chest x-ray    Interventions include:  IV fluids, analgesia, antipyretics, albuterol, symptomatic and supportive care    The patient has significant medical comorbidities that influence decision making for this acute process, such as:  Prediabetes    I decided to obtain the patient's medical records and review relevant documentation from hospital records and clinic records.  Pertinent information is noted.  Reviewed positive COVID 04/2022    Medications   pseudoephedrine tablet 60 mg (60 mg Oral Given 4/29/24 1331)   guaiFENesin 12 hr tablet 600 mg (600 mg Oral Given 4/29/24 1331)   albuterol inhaler 2 puff (2 puffs Inhalation Given 4/29/24 1223)   ketorolac injection 15 mg (15 mg Intravenous Given 4/29/24 1331)   lactated ringers bolus 1,000 mL (1,000 mLs Intravenous New Bag 4/29/24 1452)   acetaminophen tablet 650 mg (650 mg Oral Given 4/29/24 1613)   amoxicillin-clavulanate 875-125mg per tablet 1 tablet (1 tablet Oral Given 4/29/24 1651)       Results and ED Course    Labs Reviewed   CBC W/ AUTO DIFFERENTIAL - Abnormal; Notable for the following components:       Result Value    MCH 26.7 (*)     MCHC 30.9 (*)     Immature Granulocytes 3.3 (*)     Immature Grans (Abs) 0.37 (*)     All other  components within normal limits   LACTIC ACID, PLASMA - Abnormal; Notable for the following components:    Lactate (Lactic Acid) 3.4 (*)     All other components within normal limits   C-REACTIVE PROTEIN - Abnormal; Notable for the following components:    CRP 35.2 (*)     All other components within normal limits   FERRITIN - Abnormal; Notable for the following components:    Ferritin 324 (*)     All other components within normal limits    Narrative:     add on ferritin per Ganga Chavira RN/Jose Elam DO order#                   [id number] 04/29/2024 @ 15:16    ISTAT LACTATE - Abnormal; Notable for the following components:    POC Lactate 3.13 (*)     All other components within normal limits   TROPONIN I   B-TYPE NATRIURETIC PEPTIDE   PROCALCITONIN   D DIMER, QUANTITATIVE   SARS-COV-2 RNA AMPLIFICATION, QUAL   COMPREHENSIVE METABOLIC PANEL   CK   LACTATE DEHYDROGENASE   FERRITIN   SARS-COV-2 RDRP GENE       Imaging Results              X-Ray Chest PA And Lateral (Final result)  Result time 04/29/24 16:08:47      Final result by Haresh Gunter MD (04/29/24 16:08:47)                   Impression:      1. Interstitial findings are accentuated by shallow inspiratory effort, no large focal consolidation.  Clinical correlation is needed to exclude early change of edema.      Electronically signed by: Haresh Gunter MD  Date:    04/29/2024  Time:    16:08               Narrative:    EXAMINATION:  XR CHEST PA AND LATERAL    CLINICAL HISTORY:  Dyspnea, unspecified    TECHNIQUE:  PA and lateral views of the chest were performed.    COMPARISON:  06/05/2023    FINDINGS:  The cardiomediastinal silhouette is not enlarged.  There is no pleural effusion.  The trachea is midline.  The lungs are symmetrically expanded bilaterally with mildly coarse interstitial attenuation accentuated by shallow inspiratory effort..  No large focal consolidation seen.  There is no pneumothorax.  The osseous structures are unremarkable.                                       ED Course as of 04/29/24 1739   Mon Apr 29, 2024   1424 Lactic Acid Level(!): 3.4  IVF. [AC]   1424 D-Dimer: 0.42 [AC]   1424 Troponin I: <0.006 [AC]   1424 WBC: 11.19 [AC]   1424 Hemoglobin: 15.5 [AC]   1428 BNP: 10 [AC]   1528 Other than lactate, labs are rather reassuring.  Awaiting chest x-ray. [AC]   1609 Temp(!)(S): 102.9 °F (39.4 °C)  Ordered apap [AC]   1623 Chest x-ray reveals poor inspiratory effort.  No focal consolidation.  Lateral view shows no consolidation either.  Concern for sinusitis versus atypical pneumonia.  My inclination is to cover with Augmentin given his persistent symptoms over a week at this point.  This would cover for both sinusitis as well as community-acquired pneumonia.  Will recheck point of care lactate, and do ambulatory pulse ox.  If stable/improved/patient able to ambulate, then might be able to discharge home on Augmentin with close outpatient follow up. [AC]   1720 Patient had stable ambulatory pulse ox, 95% on room air.  No supplemental oxygen required.  Lactic acid is downtrending.  Patient feeling much better after symptomatic and supportive care.  We discussed observation versus discharge.  He feels that he is able to go home.  He does have support at home.  I will refer him to our at home care of [facility].  They will be able to do vital sign checks, antibiotics, and repeat lab work if needed. [AC]   1726 I sent a message to our social work care team and the PA covering for the [facility] service.  They will arrange for follow up in the morning. [AC]   1739 Patient agreeable to discharge.  Medications sent to his pharmacy. [AC]      ED Course User Index  [AC] Jose Elam, DO               EKG interpreted by myself    EKG  Performed: 04/29/2024   Rate/Rhythm/Axis: 98 bpm, sinus rhythm, nml axis  QRS 76 ms  Qtc 372 ms  Impression:  Normal Sinus Rhythm.  EKG without evidence of Na channel blockade, K channel blockade, there is no  prolonged QTc or digoxin effect.  There is no STEMI or signs of ischemia.  No ischemia.  Normal intervals.  Faster than previous.      Relevant imaging interpreted by myself      Impression and Plan    53 y.o. male with findings of most likely atypical pneumonia with sinusitis and given duration I am opting to treat with antibiotics based on the work up in the emergency department as above.    Important lab/imaging findings include:  Reviewed as above without consolidation on chest x-ray but I suspect atypical pneumonia and sinusitis given duration of symptoms    I consulted with:  Social work  Refer to [facility] for home care    All tests, treatment options and disposition options were discussed with the patient.  The decision was made to discharge the patient to home.    The patient was discharged in stable condition and all further questions/concerns by patient and/or family were addressed.    The patient will follow up with their primary care physician and the [facility] versus home health care as discussed in the next several days or return if any further concerns or change in symptoms necessitating re-evaluation.           Final diagnoses:  [U07.1] COVID-19  [R06.00] Dyspnea  [J06.9] URI with cough and congestion (Primary)  [J32.9] Sinusitis, unspecified chronicity, unspecified location        ED Disposition Condition    Discharge Stable          ED Prescriptions       Medication Sig Dispense Start Date End Date Auth. Provider    albuterol (PROVENTIL HFA) 90 mcg/actuation inhaler  (Status: Discontinued) Inhale 2 puffs into the lungs every 6 (six) hours as needed for Wheezing. Rescue 18 g 4/29/2024 4/29/2024 Jose Elam, DO    amoxicillin-clavulanate 875-125mg (AUGMENTIN) 875-125 mg per tablet  (Status: Discontinued) Take 1 tablet by mouth 2 (two) times daily. for 7 days 14 tablet 4/29/2024 4/29/2024 Jose Elam, DO    pseudoephedrine (SUDAFED) 60 MG tablet  (Status: Discontinued) Take 1  tablet (60 mg total) by mouth every 12 (twelve) hours as needed for Congestion. 14 tablet 4/29/2024 4/29/2024 Jose Elam,     pseudoephedrine (SUDAFED) 60 MG tablet Take 1 tablet (60 mg total) by mouth every 12 (twelve) hours as needed for Congestion. 14 tablet 4/29/2024 5/6/2024 Jose Elam,     amoxicillin-clavulanate 875-125mg (AUGMENTIN) 875-125 mg per tablet Take 1 tablet by mouth 2 (two) times daily. for 7 days 14 tablet 4/29/2024 5/6/2024 Jose Elam,     albuterol (PROVENTIL HFA) 90 mcg/actuation inhaler Inhale 2 puffs into the lungs every 6 (six) hours as needed for Wheezing. Rescue 18 g 4/29/2024 4/29/2025 Jose Elam, DO          Follow-up Information       Follow up With Specialties Details Why Contact Info    Anna Montes MD Family Medicine Schedule an appointment as soon as possible for a visit in 1 week You can do a virtual visit [address] [place] [place] [id number]  [phone]                 Joes Elam,   04/29/24 2811

## 2024-04-29 NOTE — TELEPHONE ENCOUNTER
Reason for Disposition   Patient already left for the hospital/clinic    Protocols used: No Contact or Duplicate Contact Call-A-OH  Called patient due to text response on covid home monitoring system. Pt was already in the ER being seen for worsening of symptoms.

## 2024-04-29 NOTE — Clinical Note
"Barthelemy "Barthelemy" Jackie was seen and treated in our emergency department on 4/29/2024.  He may return to work on 05/02/2024.       If you have any questions or concerns, please don't hesitate to call.      Jose Elam, DO"

## 2024-04-29 NOTE — DISCHARGE INSTRUCTIONS
You were evaluated in the emergency department today for fever/cough/COVID.  Although there were no findings of concern to necessitate admission to the hospital or warrant immediate surgical intervention, disease exists on a spectrum and your disease process may progress.  If this is the case, please watch your symptoms and return to the emergency department if you feel worse and are unable to discuss care with your primary care doctor in follow up in the next several days.  Specific information regarding your complaint has been provided.  Thank you for choosing [facility]!    You were evaluated in the emergency department and found to have what is thought to be atypical pneumonia and possible sinusitis.  Your symptoms has been going on for several days, and I think it was reasonable to start antibiotics given your ongoing symptoms.  This is likely secondary to recent viral infection.  I did arrange for you to have outpatient follow up in your home with our medical services.  They will call you to arrange a follow-up visit.  I sent medications to your pharmacy to help with your symptoms and antibiotics as well.  Please return at any point if you feel worse, have trouble breathing, pass out, chest pain, or any other concerns.

## 2024-05-01 ENCOUNTER — OFFICE VISIT (OUTPATIENT)
Dept: FAMILY MEDICINE | Facility: CLINIC | Age: 54
End: 2024-05-01
Payer: COMMERCIAL

## 2024-05-01 VITALS
SYSTOLIC BLOOD PRESSURE: 120 MMHG | HEIGHT: 74 IN | HEART RATE: 96 BPM | TEMPERATURE: 99 F | BODY MASS INDEX: 34.83 KG/M2 | OXYGEN SATURATION: 97 % | WEIGHT: 271.38 LBS | DIASTOLIC BLOOD PRESSURE: 82 MMHG

## 2024-05-01 DIAGNOSIS — R05.9 COUGH, UNSPECIFIED TYPE: ICD-10-CM

## 2024-05-01 DIAGNOSIS — U07.1 COVID-19: Primary | ICD-10-CM

## 2024-05-01 PROCEDURE — 3079F DIAST BP 80-89 MM HG: CPT | Mod: CPTII,S$GLB,, | Performed by: FAMILY MEDICINE

## 2024-05-01 PROCEDURE — 3074F SYST BP LT 130 MM HG: CPT | Mod: CPTII,S$GLB,, | Performed by: FAMILY MEDICINE

## 2024-05-01 PROCEDURE — 99204 OFFICE O/P NEW MOD 45 MIN: CPT | Mod: S$GLB,,, | Performed by: FAMILY MEDICINE

## 2024-05-01 PROCEDURE — 1159F MED LIST DOCD IN RCRD: CPT | Mod: CPTII,S$GLB,, | Performed by: FAMILY MEDICINE

## 2024-05-01 PROCEDURE — 3008F BODY MASS INDEX DOCD: CPT | Mod: CPTII,S$GLB,, | Performed by: FAMILY MEDICINE

## 2024-05-01 PROCEDURE — 99999 PR PBB SHADOW E&M-EST. PATIENT-LVL III: CPT | Mod: PBBFAC,,, | Performed by: FAMILY MEDICINE

## 2024-05-01 PROCEDURE — 1160F RVW MEDS BY RX/DR IN RCRD: CPT | Mod: CPTII,S$GLB,, | Performed by: FAMILY MEDICINE

## 2024-05-01 RX ORDER — FLUTICASONE PROPIONATE 50 MCG
2 SPRAY, SUSPENSION (ML) NASAL DAILY
Qty: 16 G | Refills: 0 | Status: SHIPPED | OUTPATIENT
Start: 2024-05-01 | End: 2024-05-31

## 2024-05-01 RX ORDER — BENZONATATE 100 MG/1
100 CAPSULE ORAL 3 TIMES DAILY PRN
Qty: 30 CAPSULE | Refills: 0 | Status: SHIPPED | OUTPATIENT
Start: 2024-05-01 | End: 2024-05-11

## 2024-05-01 RX ORDER — PROMETHAZINE HYDROCHLORIDE AND DEXTROMETHORPHAN HYDROBROMIDE 6.25; 15 MG/5ML; MG/5ML
5 SYRUP ORAL EVERY 6 HOURS PRN
Qty: 180 ML | Refills: 0 | Status: SHIPPED | OUTPATIENT
Start: 2024-05-01 | End: 2024-05-01 | Stop reason: SDUPTHER

## 2024-05-01 RX ORDER — PROMETHAZINE HYDROCHLORIDE AND DEXTROMETHORPHAN HYDROBROMIDE 6.25; 15 MG/5ML; MG/5ML
5 SYRUP ORAL EVERY 6 HOURS PRN
Qty: 180 ML | Refills: 1 | Status: SHIPPED | OUTPATIENT
Start: 2024-05-01 | End: 2024-05-11

## 2024-05-01 RX ORDER — FLUTICASONE PROPIONATE AND SALMETEROL 250; 50 UG/1; UG/1
1 POWDER RESPIRATORY (INHALATION) 2 TIMES DAILY
Qty: 60 EACH | Refills: 0 | Status: SHIPPED | OUTPATIENT
Start: 2024-05-01 | End: 2024-05-21

## 2024-05-01 NOTE — PROGRESS NOTES
"Subjective:       Patient ID: Barthelemy Jolly is a 53 y.o. male.    Chief Complaint: Follow-up    Barthelemy is a 53 y.o. male who presents today with for an UC visit for a cough that is persisting s/p covid.     Diagnosed about 10 days ago. Went to the ER and to an UC. CXR in ER was generally clear. He is on augmentin until 5/6/2024.     Still coughing. No fevers. Reports maybe "10%" better.    Not using albuterol.     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 Feels like he can't work due to his cough and recent illness.     Not UTD with covid booster, last vaccine about 3 years ago.       Review of Systems   Constitutional:  Negative for fever.   HENT:  Positive for congestion and rhinorrhea.    Respiratory:  Positive for cough. Negative for shortness of breath and wheezing.          Objective:     Vitals:    05/01/24 1346   BP: 120/82   Pulse: 96   Temp: 99.2 °F (37.3 °C)   TempSrc: Oral   SpO2: 97%   Weight: 123.1 kg (271 lb 6.2 oz)   Height: 6' 2" (1.88 m)        Physical Exam  Constitutional:       General: He is not in acute distress.     Appearance: He is not ill-appearing, toxic-appearing or diaphoretic.   HENT:      Nose: Congestion and rhinorrhea present.      Mouth/Throat:      Pharynx: No oropharyngeal exudate or posterior oropharyngeal erythema.   Cardiovascular:      Rate and Rhythm: Normal rate and regular rhythm.   Pulmonary:      Effort: Pulmonary effort is normal. No respiratory distress.      Breath sounds: Normal breath sounds. No wheezing or rales.      Comments: No wheezing even after 10 jumping jacks.   Musculoskeletal:         General: No swelling.   Lymphadenopathy:      Cervical: No cervical adenopathy.   Neurological:      Mental Status: He is alert.         Assessment:       1. COVID-19    2. Cough, unspecified type        Plan:         Complete abx  Advair twice daily - rinse throat after every use to avoid infection due to steroid component  Tessalon perles.   Cough syrup    RTW Wednesday    F/u " with PCP if symptoms persist.     COVID-19  -     fluticasone-salmeterol diskus inhaler 250-50 mcg; Inhale 1 puff into the lungs 2 (two) times daily.  Dispense: 60 each; Refill: 0  -     benzonatate (TESSALON) 100 MG capsule; Take 1 capsule (100 mg total) by mouth 3 (three) times daily as needed for Cough.  Dispense: 30 capsule; Refill: 0  -     Discontinue: promethazine-dextromethorphan (PROMETHAZINE-DM) 6.25-15 mg/5 mL Syrp; Take 5 mLs by mouth every 6 (six) hours as needed.  Dispense: 180 mL; Refill: 0  -     promethazine-dextromethorphan (PROMETHAZINE-DM) 6.25-15 mg/5 mL Syrp; Take 5 mLs by mouth every 6 (six) hours as needed.  Dispense: 180 mL; Refill: 1  -     fluticasone propionate (FLONASE) 50 mcg/actuation nasal spray; 2 sprays (100 mcg total) by Each Nostril route once daily.  Dispense: 16 g; Refill: 0    Cough, unspecified type  -     fluticasone-salmeterol diskus inhaler 250-50 mcg; Inhale 1 puff into the lungs 2 (two) times daily.  Dispense: 60 each; Refill: 0  -     benzonatate (TESSALON) 100 MG capsule; Take 1 capsule (100 mg total) by mouth 3 (three) times daily as needed for Cough.  Dispense: 30 capsule; Refill: 0  -     Discontinue: promethazine-dextromethorphan (PROMETHAZINE-DM) 6.25-15 mg/5 mL Syrp; Take 5 mLs by mouth every 6 (six) hours as needed.  Dispense: 180 mL; Refill: 0  -     promethazine-dextromethorphan (PROMETHAZINE-DM) 6.25-15 mg/5 mL Syrp; Take 5 mLs by mouth every 6 (six) hours as needed.  Dispense: 180 mL; Refill: 1  -     fluticasone propionate (FLONASE) 50 mcg/actuation nasal spray; 2 sprays (100 mcg total) by Each Nostril route once daily.  Dispense: 16 g; Refill: 0

## 2024-05-01 NOTE — PATIENT INSTRUCTIONS
Complete abx  Advair twice daily - rinse throat after every use to avoid infection due to steroid component  Tessalon perles.   Cough syrup  F/u with PCP

## 2024-05-02 DIAGNOSIS — U07.1 COVID-19: ICD-10-CM

## 2024-05-02 DIAGNOSIS — R05.9 COUGH, UNSPECIFIED TYPE: ICD-10-CM

## 2024-05-02 RX ORDER — FLUTICASONE PROPIONATE 50 MCG
2 SPRAY, SUSPENSION (ML) NASAL
Qty: 48 ML | Refills: 1 | OUTPATIENT
Start: 2024-05-02

## 2024-05-02 NOTE — TELEPHONE ENCOUNTER
Refill Decision Note  Quick DC. Request already responded to by other means (e.g. phone or fax)    Barthelemy Jackie  is requesting a refill authorization.  Brief Assessment and Rationale for Refill:  Quick Discontinue     Medication Therapy Plan:       Medication Reconciliation Completed: No   Comments:           Note composed:4:12 PM 05/02/2024

## 2024-05-21 DIAGNOSIS — R05.9 COUGH, UNSPECIFIED TYPE: ICD-10-CM

## 2024-05-21 DIAGNOSIS — U07.1 COVID-19: ICD-10-CM

## 2024-05-21 RX ORDER — FLUTICASONE PROPIONATE AND SALMETEROL 250; 50 UG/1; UG/1
POWDER RESPIRATORY (INHALATION)
Qty: 60 EACH | Refills: 0 | Status: SHIPPED | OUTPATIENT
Start: 2024-05-21

## 2024-05-21 NOTE — TELEPHONE ENCOUNTER
Refill Routing Note   Medication(s) are not appropriate for processing by [facility] for the following reason(s):      - NO PCP LISTED IN EPIC; ROUTING TO DR STEVENSON AS LAST PRESCRIBING PROVIDER    [facility] action(s):  Route          Medication reconciliation completed: No     Appointments  past 12m or future 3m with PCP    Date Provider   Last Visit   5/1/2024 David Stevenson DO   Next Visit   Visit date not found David Stevenson DO   ED visits in past 90 days: 1        Note composed:8:54 AM 05/21/2024

## 2024-10-25 ENCOUNTER — PATIENT MESSAGE (OUTPATIENT)
Dept: RESEARCH | Facility: HOSPITAL | Age: 54
End: 2024-10-25
Payer: COMMERCIAL

## 2025-03-12 ENCOUNTER — HOSPITAL ENCOUNTER (OUTPATIENT)
Dept: RADIOLOGY | Facility: OTHER | Age: 55
Discharge: HOME | End: 2025-03-12
Attending: INTERNAL MEDICINE
Payer: COMMERCIAL

## 2025-03-12 ENCOUNTER — OFFICE VISIT (OUTPATIENT)
Dept: INTERNAL MEDICINE | Facility: CLINIC | Age: 55
End: 2025-03-12
Payer: COMMERCIAL

## 2025-03-12 ENCOUNTER — NURSE TRIAGE (OUTPATIENT)
Dept: ADMINISTRATIVE | Facility: CLINIC | Age: 55
End: 2025-03-12
Payer: COMMERCIAL

## 2025-03-12 VITALS
HEART RATE: 69 BPM | HEIGHT: 74 IN | BODY MASS INDEX: 34.03 KG/M2 | WEIGHT: 265.19 LBS | SYSTOLIC BLOOD PRESSURE: 119 MMHG | DIASTOLIC BLOOD PRESSURE: 78 MMHG

## 2025-03-12 DIAGNOSIS — G89.29 CHRONIC PAIN OF BOTH KNEES: ICD-10-CM

## 2025-03-12 DIAGNOSIS — M25.561 CHRONIC PAIN OF BOTH KNEES: ICD-10-CM

## 2025-03-12 DIAGNOSIS — M25.532 LEFT WRIST PAIN: ICD-10-CM

## 2025-03-12 DIAGNOSIS — M25.532 LEFT WRIST PAIN: Primary | ICD-10-CM

## 2025-03-12 DIAGNOSIS — M25.562 CHRONIC PAIN OF BOTH KNEES: ICD-10-CM

## 2025-03-12 DIAGNOSIS — M79.645 PAIN OF LEFT THUMB: ICD-10-CM

## 2025-03-12 PROCEDURE — 73562 X-RAY EXAM OF KNEE 3: CPT | Mod: TC,50,FY

## 2025-03-12 PROCEDURE — 73110 X-RAY EXAM OF WRIST: CPT | Mod: 26,,, | Performed by: RADIOLOGY

## 2025-03-12 PROCEDURE — 1159F MED LIST DOCD IN RCRD: CPT | Mod: CPTII,S$GLB,, | Performed by: STUDENT IN AN ORGANIZED HEALTH CARE EDUCATION/TRAINING PROGRAM

## 2025-03-12 PROCEDURE — 3078F DIAST BP <80 MM HG: CPT | Mod: CPTII,S$GLB,, | Performed by: STUDENT IN AN ORGANIZED HEALTH CARE EDUCATION/TRAINING PROGRAM

## 2025-03-12 PROCEDURE — 3074F SYST BP LT 130 MM HG: CPT | Mod: CPTII,S$GLB,, | Performed by: STUDENT IN AN ORGANIZED HEALTH CARE EDUCATION/TRAINING PROGRAM

## 2025-03-12 PROCEDURE — 99999 PR PBB SHADOW E&M-EST. PATIENT-LVL IV: CPT | Mod: PBBFAC,,, | Performed by: STUDENT IN AN ORGANIZED HEALTH CARE EDUCATION/TRAINING PROGRAM

## 2025-03-12 PROCEDURE — 1160F RVW MEDS BY RX/DR IN RCRD: CPT | Mod: CPTII,S$GLB,, | Performed by: STUDENT IN AN ORGANIZED HEALTH CARE EDUCATION/TRAINING PROGRAM

## 2025-03-12 PROCEDURE — 99213 OFFICE O/P EST LOW 20 MIN: CPT | Mod: GE,S$GLB,, | Performed by: STUDENT IN AN ORGANIZED HEALTH CARE EDUCATION/TRAINING PROGRAM

## 2025-03-12 PROCEDURE — 73110 X-RAY EXAM OF WRIST: CPT | Mod: TC,50,FY

## 2025-03-12 PROCEDURE — 3008F BODY MASS INDEX DOCD: CPT | Mod: CPTII,S$GLB,, | Performed by: STUDENT IN AN ORGANIZED HEALTH CARE EDUCATION/TRAINING PROGRAM

## 2025-03-12 PROCEDURE — 73562 X-RAY EXAM OF KNEE 3: CPT | Mod: 26,,, | Performed by: RADIOLOGY

## 2025-03-12 RX ORDER — MELOXICAM 15 MG/1
15 TABLET ORAL DAILY
Qty: 14 TABLET | Refills: 0 | Status: SHIPPED | OUTPATIENT
Start: 2025-03-12 | End: 2025-03-26

## 2025-03-12 NOTE — TELEPHONE ENCOUNTER
"Patient c/o wrist and knee pain. Advised per protocol to be seen in the office today. An appointment was scheduled for the patient. Advised the patient to call back with any further questions or if symptoms worsen.      Reason for Disposition   SEVERE pain (e.g., excruciating, unable to use wrist at all)   MODERATE pain (e.g., symptoms interfere with work or school, limping) and present > 3 days    Additional Information   Negative: Similar pain previously and it was from "heart attack"   Negative: Similar pain previously from 'angina' and not relieved by nitroglycerin   Negative: Sounds like a life-threatening emergency to the triager   Negative: Swollen joint and fever   Negative: Red area or streak and fever   Negative: Patient sounds very sick or weak to the triager   Negative: Sounds like a life-threatening emergency to the triager   Negative: Swollen knee joint and fever   Negative: Patient sounds very sick or weak to the triager   Negative: Can't move swollen joint at all   Negative: Thigh or calf pain and only 1 side and present > 1 hour   Negative: Thigh or calf swelling and only 1 side   Negative: SEVERE pain (e.g., excruciating, unable to walk)   Negative: Very swollen knee joint   Negative: Painful rash with multiple small blisters grouped together (i.e., dermatomal distribution or 'band' or 'stripe')   Negative: Looks like a boil, infected sore, deep ulcer, or other infected rash (spreading redness, pus)    Protocols used: Wrist Pain-A-OH, Knee Pain-A-OH    "

## 2025-03-12 NOTE — PATIENT INSTRUCTIONS
Please follow-up with orthopedic surgery and physical therapy.    You can schedule with orthopedic surgery at [phone].    Please take Meloxicam 15mg once daily for pain. Please take with meal.     If you notice swelling, fevers, chills please seek care immediately.

## 2025-03-12 NOTE — PROGRESS NOTES
Subjective     Chief Complaint: Left wrist, bilateral knee, right thenar pain    History of Present Illness:  Mr. Barthelemy Jolly is a 54 y.o. male with a history of knee surgery ( s/p trauma arthroscopic surgery), back pain who presents for pain in his left wrist, right thenar, bilateral knee pain.    Pain in his left wrist began a couple of day sago, right thenar space began about a week ago. Woke up with the pain. No swelling, no trauma, no fever/chills or other symptoms. No recent salty or high uric acid foods such as seafood boil. He does not drink or smoke. No history of gout. Pain reproducible on movement. But has good range of motion. Patient agreeable to PT/OT. Tried Alleve x1 but did not improve. Wants to establish with PCP in the future for labs.      He had a right knee injury many years ago playing soccer. Got arthroscopic surgery in [place] however does not remember what surgery he underwent. States he had posterior laxity previously in the past but now feels it on both sides. Denies any new trauma or falls but he is worried about his knees.     Smoke: none  Alcohol Use: None  Reacreational drugs: none    Review of Systems   Constitutional:  Negative for chills and fever.   HENT:  Negative for congestion and sore throat.    Respiratory:  Negative for cough and shortness of breath.    Cardiovascular:  Negative for chest pain and leg swelling.   Gastrointestinal:  Negative for abdominal pain, diarrhea, nausea and vomiting.   Genitourinary:  Negative for dysuria and flank pain.   Musculoskeletal:  Positive for joint pain. Negative for back pain and myalgias.   Neurological:  Negative for weakness and headaches.   Psychiatric/Behavioral:  Negative for depression. The patient is not nervous/anxious.        PAST HISTORY:     No past medical history on file.    Past Surgical History:   Procedure Laterality Date    COLONOSCOPY N/A 10/28/2021    Procedure: COLONOSCOPY;  Surgeon: Massiel Nugent MD;  Location:  [facility] ([facility]);  Service: Endoscopy;  Laterality: N/A;  pt completed COVID vaccine- see Immunization record in chart-rb   10/25 arrival time confirmed with pt-rb    KNEE SURGERY         No family history on file.    Social History     Socioeconomic History    Marital status:    Tobacco Use    Smoking status: Never    Smokeless tobacco: Never   Substance and Sexual Activity    Alcohol use: No    Drug use: No       MEDICATIONS & ALLERGIES:     Current Outpatient Medications on File Prior to Visit   Medication Sig    LIDOcaine (LIDODERM) 5 % Place 1 patch onto the skin daily as needed (bak pain). Remove & Discard patch within 12 hours or as directed by MD (Patient not taking: Reported on 4/22/2024)    WIXELA INHUB 250-50 mcg/dose diskus inhaler INHALE 1 PUFF INTO THE LUNGS TWICE A DAY     No current facility-administered medications on file prior to visit.       Review of patient's allergies indicates:  No Known Allergies    OBJECTIVE:     Vital Signs:  Vitals:    03/12/25 1524   BP: 119/78   BP Location: Right arm   Patient Position: Sitting   Pulse: 69   Weight: 120.3 kg (265 lb 3.4 oz)   Height: 6' 2" (1.88 m)       Body mass index is 34.05 kg/m².     Physical Exam:  General:  Well developed, well nourished, no acute distress  Head: Normocephalic, atraumatic  Eyes: No conjunctival abnormality  Neck: Normal ROM  CVS:  RRR, S1 and S2 normal, no murmurs, rubs, gallops, 2+ radial pulses  Resp:  Lungs clear to auscultation, no wheezes, rales, rhonchi, cough  GI:  Abdomen soft, non-tender, non-distended  MSK:  Negative joint laxity in bilateral knees, no joint tenderness or swelling noted. Bilateral wrists without edema or erythema. Pain reproducible on abduction of left wrist. No muscle atrophy, cyanosis, peripheral edema   Skin:  No rashes  Neuro:  CNII-XII grossly intact, no focal deficits noted  Psych:  Appropriate mood and affect, normal judgement    Laboratory  Lab Results   Component Value Date    WBC " "11.19 04/29/2024    HGB 15.5 04/29/2024    HCT 50.1 04/29/2024    MCV 86 04/29/2024     04/29/2024     Lab Results   Component Value Date    INR 1.0 10/27/2010     Lab Results   Component Value Date    HGBA1C 5.8 (H) 03/16/2021     No results for input(s): "POCTGLUCOSE" in the last 72 hours.          Diagnostic Results:  Labs: Reviewed    ASSESSMENT & PLAN:   Mr. Barthelemy Jolly is a 54 y.o. male who presents today with joint pain.    Barthelemy was seen today for wrist pain, hip pain and knee pain.    Diagnoses and all orders for this visit:    Left wrist pain  -     meloxicam (MOBIC) 15 MG tablet; Take 1 tablet (15 mg total) by mouth once daily. for 14 days  -     Ambulatory Referral/Consult to Physical/Occupational Therapy; Future  -     X-Ray Wrist Complete Bilateral; Future    Pain of left thumb  -     meloxicam (MOBIC) 15 MG tablet; Take 1 tablet (15 mg total) by mouth once daily. for 14 days  -     Ambulatory Referral/Consult to Physical/Occupational Therapy; Future  -     X-Ray Wrist Complete Bilateral; Future    Chronic pain of both knees  -     meloxicam (MOBIC) 15 MG tablet; Take 1 tablet (15 mg total) by mouth once daily. for 14 days  -     X-Ray Knee 3 View Bilateral; Future  -     Ambulatory referral/consult to Orthopedics; Future      Overall his upper extremity pain seems consistent with his work at [employer], occasionally uses tools and does twisting motions. No trauma endorsed, likely MSK pain in the wrists. Recommended meloxicam and PT/OT with x-rays to rule out fractures or severe arthritis.     Knee pain likely 2/2 to his prior injury in right knee and compensation in the left. He describes it as laxity type feeling however none on exam. Recommend ortho follow-up after knee-xray. He may need MRI if joint laxity is a concern.           RTC prn    Case discussed with Dr lAlen.    Jann Polo,   Internal Medicine PGY3  [facility]  [address], [place] " [id number] [phone]

## 2025-03-13 ENCOUNTER — TELEPHONE (OUTPATIENT)
Dept: INTERNAL MEDICINE | Facility: CLINIC | Age: 55
End: 2025-03-13
Payer: COMMERCIAL

## 2025-03-13 NOTE — TELEPHONE ENCOUNTER
Spoke with the patient regarding his knee x-ray results. He will schedule with orthopedic surgery. Wrist x-rays look normal. Recommended rest and NSAIDS for now along with PT/OT.

## 2025-03-17 ENCOUNTER — OFFICE VISIT (OUTPATIENT)
Dept: ORTHOPEDICS | Facility: CLINIC | Age: 55
End: 2025-03-17
Payer: COMMERCIAL

## 2025-03-17 VITALS
HEIGHT: 74 IN | SYSTOLIC BLOOD PRESSURE: 124 MMHG | DIASTOLIC BLOOD PRESSURE: 78 MMHG | WEIGHT: 272.06 LBS | HEART RATE: 70 BPM | BODY MASS INDEX: 34.91 KG/M2

## 2025-03-17 DIAGNOSIS — Z98.890 HISTORY OF ARTHROSCOPY OF RIGHT KNEE: ICD-10-CM

## 2025-03-17 DIAGNOSIS — M17.0 PRIMARY OSTEOARTHRITIS OF BOTH KNEES: Primary | ICD-10-CM

## 2025-03-17 PROCEDURE — 3008F BODY MASS INDEX DOCD: CPT | Mod: CPTII,S$GLB,, | Performed by: NURSE PRACTITIONER

## 2025-03-17 PROCEDURE — 1159F MED LIST DOCD IN RCRD: CPT | Mod: CPTII,S$GLB,, | Performed by: NURSE PRACTITIONER

## 2025-03-17 PROCEDURE — 1160F RVW MEDS BY RX/DR IN RCRD: CPT | Mod: CPTII,S$GLB,, | Performed by: NURSE PRACTITIONER

## 2025-03-17 PROCEDURE — 99999 PR PBB SHADOW E&M-EST. PATIENT-LVL III: CPT | Mod: PBBFAC,,, | Performed by: NURSE PRACTITIONER

## 2025-03-17 PROCEDURE — 3074F SYST BP LT 130 MM HG: CPT | Mod: CPTII,S$GLB,, | Performed by: NURSE PRACTITIONER

## 2025-03-17 PROCEDURE — 3078F DIAST BP <80 MM HG: CPT | Mod: CPTII,S$GLB,, | Performed by: NURSE PRACTITIONER

## 2025-03-17 PROCEDURE — 99204 OFFICE O/P NEW MOD 45 MIN: CPT | Mod: S$GLB,,, | Performed by: NURSE PRACTITIONER

## 2025-03-17 RX ORDER — MELOXICAM 15 MG/1
15 TABLET ORAL DAILY
Qty: 30 TABLET | Refills: 0 | Status: SHIPPED | OUTPATIENT
Start: 2025-03-17 | End: 2025-04-16

## 2025-03-17 NOTE — PROGRESS NOTES
SUBJECTIVE:   History of Present Illness    CHIEF COMPLAINT:  - Bilateral knee pain    HPI:  Barthelemy presents with bilateral knee pain that has worsened over the last two weeks. He describes pain as affecting the entirety of both knees, including the internal structures. Pain is constant, and the patient has learned to manage it. He reports difficulty moving due to pain, particularly when ascending and descending stairs.    He has a history of right knee injury that occurred many years ago, with subsequent surgery around 2005 or 2006 in [place]. The surgery was described as an arthroscopic procedure to address internal knee structures. He completed physical therapy following the surgery.    Currently, he is using lidocaine patches for pain management, which were previously prescribed. Dr. Polo recently prescribed meloxicam, which he reports has been helpful in reducing pain in both the knees and hands. He states that it has provided some relief compared to his previous level of pain.    He also reports occasional back pain, which varies in severity.    Barthelemy denies any falls or injuries.    PREVIOUS TREATMENTS:  - Lidocaine patches: Previously prescribed, patient still has some, but it's unclear if they are currently using them or if they provided benefit.    MEDICATIONS:  - Lidocaine patches: Previously prescribed, patient still has some but unclear if currently using.    SURGICAL HISTORY:  - Right knee surgery (likely arthroscopy): 2005 or 2006 in [place] to address internal knee structures, possibly ligament repair      ROS:  Constitutional: -fever, -chills  Eyes: -visual changes  ENT: -nasal congestion, -sore throat  Respiratory: -cough, -shortness of breath  Cardiovascular: -chest pain, -palpitations  Gastrointestinal: -nausea, -vomiting  Genitourinary: -hematuria, -dysuria  Integument/Breast: -rash, -pruritus, -breast skin changes  Hematologic/Lymphatic: -easy bruising,  "-lymphadenopathy  Musculoskeletal: +arthralgia, -myalgia, +limb pain, +pain with movement, +back pain  Neurological: -seizures, -tremors  Behavioral/Psych: -hallucinations  Endocrine: -heat intolerance, -cold intolerance         Review of patient's allergies indicates:  No Known Allergies      Current Medications[1]    History reviewed. No pertinent past medical history.    Past Surgical History:   Procedure Laterality Date    COLONOSCOPY N/A 10/28/2021    Procedure: COLONOSCOPY;  Surgeon: Massiel Nugent MD;  Location: [address];  Service: Endoscopy;  Laterality: N/A;  pt completed COVID vaccine- see Immunization record in chart-rb   10/25 arrival time confirmed with pt-rb    KNEE SURGERY         No family history on file.    OBJECTIVE:     PHYSICAL EXAM:  Vital Signs (Most Recent)  Vitals:    03/17/25 0911   BP: 124/78   Pulse: 70     Height: 6' 2" (188 cm) Weight: 123.4 kg (272 lb 0.8 oz),   Estimated body mass index is 34.93 kg/m² as calculated from the following:    Height as of this encounter: 6' 2" (1.88 m).    Weight as of this encounter: 123.4 kg (272 lb 0.8 oz).   General Appearance: Well nourished, well developed, in no acute distress.  HENT: Normal cephalic, oropharynx pink and moist  Eyes: PERRLA bilaterally and EOM x 4  Respiratory: Even and unlabored  Skin: Warm and Dry.   Psychiatric: AAO x 4, Mood & affect are normal.    Physical Exam    Musculoskeletal: Right knee is not red, inflamed, or swollen. No tenderness on right knee palpation. Right knee muscle strength 5/5. Right knee range of motion 0 to 120 degrees. Tenderness with stress on right knee. No tenderness with valgus stress on right knee. Pain on medial side of right knee. Laxity on anterior draw test on right knee. Left knee muscle strength 5/5. Left knee range of motion 0 to 120 degrees. Left knee is not red, warm, or swollen. Tightness with varus stress on left knee. No pain with valgus stress on left knee. Anterior draw and " Lachman test on left knee normal.  IMAGING:  - X-rays of both knees: March 12, 2025, No acute fractures, Osteoarthritis in both knees  - Right knee: Narrowing across the board, some deformity on the patella where it rubs across the femur  - Left knee: Some narrowing, mostly on one side, less severe than the right knee  - Merchant view: Patella is close to the femur on both knees, consistent with osteoarthritis         All radiographs were personally reviewed by me.    ASSESSMENT/PLAN:       ICD-10-CM ICD-9-CM   1. Primary osteoarthritis of both knees  M17.0 715.16   2. History of arthroscopy of right knee  Z98.890 V45.89     54-year-old male who presents to Orthopedic Clinic with chief complaint of bilateral knee pain consistent with osteoarthritis.  Patient has a history of arthroscopic repair involving the right knee about 20 years ago in [place].  He was recently started on meloxicam for his knee and wrist pain.  He reports the meloxicam has helped.  I will extend his meloxicam prescription.    X-ray consistent with osteoarthritis in the knee.  Advised him we will attempt conservative measures 1st including physical therapy.  If pain fails to improve we will consider knee injections and re-evaluate.    Assessment & Plan    MEDICATIONS:  - Extended meloxicam prescription for 1 month.    REFERRALS:  - Referred to PT for knee conditioning and strengthening.    PROCEDURES:  - Discussed treatment options with the patient, including PT, knee injections (steroid and gel), and potential knee replacement if other treatments are ineffective.  - Injections are not guaranteed to work and require insurance approval for gel injections.    FOLLOW UP:  - Follow up after 4-6 weeks of PT if problems persist.         This note was generated with the assistance of ambient listening technology. Verbal consent was obtained by the patient and accompanying visitor(s) for the recording of patient appointment to facilitate this note. I  attest to having reviewed and edited the generated note for accuracy, though some syntax or spelling errors may persist. Please contact the author of this note for any clarification.             [1]   Current Outpatient Medications   Medication Sig Dispense Refill    LIDOcaine (LIDODERM) 5 % Place 1 patch onto the skin daily as needed (bak pain). Remove & Discard patch within 12 hours or as directed by MD 15 patch 0    WIXELA INHUB 250-50 mcg/dose diskus inhaler INHALE 1 PUFF INTO THE LUNGS TWICE A DAY 60 each 0    meloxicam (MOBIC) 15 MG tablet Take 1 tablet (15 mg total) by mouth once daily. 30 tablet 0     No current facility-administered medications for this visit.

## 2025-03-31 ENCOUNTER — CLINICAL SUPPORT (OUTPATIENT)
Dept: REHABILITATION | Facility: OTHER | Age: 55
End: 2025-03-31
Payer: COMMERCIAL

## 2025-03-31 DIAGNOSIS — M17.0 PRIMARY OSTEOARTHRITIS OF BOTH KNEES: ICD-10-CM

## 2025-03-31 DIAGNOSIS — Z98.890 HISTORY OF ARTHROSCOPY OF RIGHT KNEE: Primary | ICD-10-CM

## 2025-03-31 PROCEDURE — 97161 PT EVAL LOW COMPLEX 20 MIN: CPT

## 2025-03-31 PROCEDURE — 97112 NEUROMUSCULAR REEDUCATION: CPT

## 2025-03-31 NOTE — PROGRESS NOTES
Outpatient Rehab    Physical Therapy Evaluation    Patient Name: Barthelemy Jolly  MRN: [id number]  YOB: 1970  Encounter Date: 3/31/2025    Therapy Diagnosis:   Encounter Diagnoses   Name Primary?    Primary osteoarthritis of both knees     History of arthroscopy of right knee Yes     Physician: Angel Palomo NP    Physician Orders: Eval and Treat  Medical Diagnosis: Primary osteoarthritis of both knees  History of arthroscopy of right knee    Visit # / Visits Authorized:  1 / 1  Insurance Authorization Period: 3/17/2025 to 12/31/2025  Date of Evaluation: 3/31/2025  Plan of Care Certification: 3/31/2025 to 12/31/2025     Time In:   3:37 PM  Time Out:  4: 35 PM  Total Time:   58 min.  Total Billable Time:  58 min.    Intake Outcome Measure for FOTO Survey    Therapist reviewed FOTO scores for Barthelemy Jolly on 3/31/2025.   FOTO report - see Media section or FOTO account episode details.     Intake Score: 31%         Subjective   History of Present Illness  Barthelemy is a 54 y.o. male who reports to physical therapy with a chief concern of Bilateral knee.     The patient reports a medical diagnosis of M17.0 (ICD-10-CM) - Primary osteoarthritis of both knees  Z98.890 (ICD-10-CM) - History of arthroscopy of right knee.    Diagnostic tests related to this condition: X-ray.   X-Ray Details: 3/12/25 FINDINGS:  B/L knees: The bones are intact.  There is no evidence for acute fracture or bone destruction.  There are degenerative changes with spurring of the tibial spines with osteophytes at the femorotibial and patellofemoral joints.  Degenerative changes appear more pronounced on the right than on the left.  There is narrowing of the medial compartments of the femorotibial joints, greater on the right than left.  There is no plain film evidence for suprapatellar joint effusions.  Soft tissues are unremarkable. The doctor prescribed him medication for inflammation which was helping, but he states that  ""the doctor make it scary, so I dont' think I'm going to take it anymore." Patient says that he also experiences intermittent numbness often, but not everyday from lateral hips to lateral knees, and occasionally down to ankles.    Dominant Hand: Right  History of Present Condition/Illness: Patient complains of constant bilateral knee pain with right knee pain dating back to his twenties when he had an injury playing soccer and required a ligamentous/endoscopy repair. He isn't sure, but thinks he might compensate for right knee weakness with left leg. Patient has had one injection in his right knee a few years ago which didn't help too much. He states that pain has been worsening over the last few years. Aggravating factors include walking, prolong standing, stairs, sit to stand transfers, getting out of bed in the morning and standing at work.The doctor told him he needs to try therapy first, then injections, and if those don't help they will explore a knee replacement.    Activities of Daily Living  Social history was obtained from Patient.    General Prior Level of Function Comments: Independent     Patient Responsibilities: Community mobility, Driving, Financial management, Yard work, Personal ADL, Shopping, Meal prep, Laundry, Health management, Home management    Previously independent with activities of daily living? Yes     Currently independent with activities of daily living? Yes          Previously independent with instrumental activities of daily living? Yes     Currently independent with instrumental activities of daily living? Yes              Pain     Patient reports a current pain level of 8/10. Pain at best is reported as 6/10. Pain at worst is reported as 8/10.   Location: Bilateral global knees  Clinical Progression (since onset): Stable  Pain Qualities: Pressure, Discomfort, Tightness  Pain-Relieving Factors: Medications - prescription, Rest  Pain-Aggravating Factors: Walking, Transfers, Standing, " Stair climbing, Squatting, Lifting         Review of Systems  Patient reports: Osteoarthritis  Patient denies: Bladder Incontinence, Bowel Incontinence, Chest Pain, Dizziness, Fainting, Fever, Headache, Saddle Numbness, Shortness of Breath, Weight Gain, Weight Loss, Cancer History, Cardiac History, Diabetes, Gallbladder History, Recent Infection History, Rheumatoid Arthritis, and Stomach History        Living Arrangements  Living Situation  Housing: Home independently  Living Arrangements: Spouse/significant other, Children  Support Systems: Family members    Home Setup  Type of Structure: House  Home Access: Level entry  Number of Levels in Home: One level  Primary Bedroom: 1st floor  Primary Bathroom: 1st floor  Bathroom Toilet: Standard  Kitchen: 1st floor        Employment  Patient reports: Does the patient's condition impact their ability to work?  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    Barthelemy Jolly  has a past surgical history that includes Knee surgery and Colonoscopy (N/A, 10/28/2021).    Barthelemy has a current medication list which includes the following prescription(s): lidocaine, meloxicam, and wixela inhub.    Review of patient's allergies indicates:  No Known Allergies     Objective   Posture  Patient presents with a Forward head position. Flat thoracic spine and Increased lumbar lordosis is observed.     Bilateral scapulae are: Protracted and Downwardly Rotated  Left pelvis characteristics: Lateral Shift         Left hip is: Externally Rotated  Right ankle/foot exhibits: Calcaneovarus       Lower Extremity Sensation  General Lumbar/Lower Extremity Sensation  Impaired: Left  Right Lumbar/Lower Extremity Sensation  Intact: Light Touch and Static Two Point Discrimination       Left Lumbar/Lower Extremity Sensation  Intact: Light Touch  Diminished:  Static Two Point Discrimination       Decreased left L2-L3 dermatomes        Right Lower Extremity Reflexes  Patellar, L4: Normal (2+)         Achilles, S1: Normal (2+)         Left Lower Extremity Reflexes  Patellar, L4: Trace (1+)          Achilles, S1: Normal (2+)             Knee Palpation  Right Knee Palpation  Abnormal: Muscle and Bony Prominence/Bursa     tenderness to palpation of bilateral adductor luda tendon, distal ITB, bilateral medial/lateral joint lines, bilateral patellar tendons, and bilateral pes anserine    Left Knee Palpation  Abnormal: Muscle and Bony Prominence/Bursa               Hip Range of Motion   Right Hip   Active (deg) Passive (deg) Pain   Flexion 110 112     Extension 0 -3     ABduction 45 45     ADduction         External Rotation 90/90 35 40     External Rotation Prone         Internal Rotation 90/90 30 32 Yes   Internal Rotation Prone             Left Hip   Active (deg) Passive (deg) Pain   Flexion 110 111     Extension 0 -3     ABduction 45 45     ADduction         External Rotation 90/90 35 40     External Rotation Prone         Internal Rotation 90/90 33 34     Internal Rotation Prone                  Knee Range of Motion   Right Knee   Active (deg) Passive (deg) Pain   Flexion 115 118     Extension 0 -3         Left Knee   Active (deg) Passive (deg) Pain   Flexion 118 120     Extension 0 -3                        Hip Strength - Planes of Motion   Right Strength Right Pain Left Strength Left  Pain   Flexion (L2) 4-   4-     Extension 4-   4-     ABduction 4-   4-     ADduction 4-   4-     Internal Rotation 4- Yes 4-     External Rotation 3+ Yes 3+         Knee Strength   Right Strength Right Pain Left Strength Left  Pain   Flexion (S2) 4-   4-     Prone Flexion 4- Yes 4- Yes   Extension (L3) 4- Yes 4-                Hip Special Tests  Maurisio Test  Positive: Right and Left     90/90 Hamstring Test  Positive: Right and Left     + B/L Thessaly's and Knee Valgus tests    Knee  Special Tests                 Four Stage Balance Test                 Single Leg Stand - Right Foot: 60 sec  Single Leg Stand - Left Foot: 60 sec       Squat Testing  The patient completed 5 squat repetitions.  Observations  Right Side: Pain and Trendelenburg  Left Side: Trunk Lean  Bilateral: Knee Varus, Limited Ankle Dorsiflexion, and Anterior Tibial Translation Beyond Toes             Ambulation Assistance Required  Surface With  Assistive Device Without Assistive Device Details   Level Independent Independent      Uneven         Curb           Gait Analysis  Base of Support: Wide  Gait Pattern: Antalgic    Right Side Walking Observations  Decreased: Stance Time, Step Length, and Arm Swing  Right Foot Contact Pattern: Flat foot    Left Side Walking Observations  Increased: Stance Time  Decreased: Step Length and Arm Swing  Left Foot Contact Pattern: Flat foot  Pelvis Observations During Gait  Bilateral: Decreased Transverse Plane Rotation  Hip Observations During Gait  Right: Hip Trendelenburg  Knee Observations During Gait  Right: Decreased Knee Flexion in Swing and Quadriceps Avoidance  Bilateral: Decreased Knee Extension in Initial Contact and Knee Decreased Extension in Midstance  Ankle/Foot Observations During Gait  Right: Ankle Delayed Push Off  Bilateral: Decreased Ankle Dorsiflexion on Swing and Flat Foot Initial Contact         Treatment:  Balance/Neuromuscular Re-Education  NMR 1: Sidelying hip abduction GTB 3 x 12  NMR 2: Supine hip flexion GTB 3 x 12  NMR 3: DL heel raises 3 x 12  NMR 4: Standing quadriceps stretch BL x 1' x 2  NMR 5: Seated hamstring stertch x 1' x 2    Time Entry(in minutes):       Assessment & Plan   Assessment  Barthelemy presents with a condition of Low complexity.   Presentation of Symptoms: Stable       Functional Limitations: Activity tolerance, Decreased ambulation distance/endurance, Completing work/school activities, Carrying objects, Functional mobility, Gait limitations,  "Painful locomotion/ambulation, Standing tolerance, Transfers, Range of motion  Impairments: Abnormal gait, Abnormal muscle firing, Abnormal muscle tone, Abnormal or restricted range of motion, Impaired physical strength, Lack of appropriate home exercise program, Pain with functional activity, Activity intolerance    Patient Goal for Therapy (PT): "To lower this pain."  Prognosis: Good  Assessment Details: Patient is a 54 year old male with a multi-decade long history of bilateral knee pain. Patient reports in his twenties he udnerwent a right knee injury playing soccer that resulted in an endoscopic ligamentous repair. 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 Upon examination, patient presents with impairments of bilateral knee and hip flexion/extension active range of motion; decreased bilateral hip external rotation strength with pain during bilateral knee flexion, right knee extension and right hip internal/external rotation; an antalgic and abnormal gait pattern; tenderness to palpation of bilateral adductor luda tendon, distal ITB, bilateral medial/lateral joint lines, bilateral patellar tendons, and bilateral pes anserine; an abnormal squat form; and positive bilateral findings for the Thessaly's, Knee Valgus Stress, Maurisio and Preston's tests. These impairments result in activity limitations with walking, transferring from sit to stand, squatting, and prolonged standing which lead to participation restrictions in general ADLs and work related duties.  Functional deficits are further evidenced by Foto score of 31%, indicating high functional limitation.  The patient will benefit from skilled physical therapy to address activity and participation restrictions of above mentioned deficits with a PT program focusing on knee range of motion and strength, gait training, postural restoration, manual therapy and " hip and pelvic strengthening and endurance.  Clinical presentation is currently stable due to consistent symptom presentation. Personal factors and comorbidities that may negatively effect plan of care include: time since injury onset. Based on today's evaluation findings and the composite of the patient's presentation, I consider the case to be of low complexity. Prognosis for PT intervention is good.       Patient's spiritual, cultural, and educational needs considered and patient agreeable to plan of care and goals.           Goals:   Active       Long-term goals       Report decreased in pain at worse less than  <   / =  5/10  to increase tolerance for functional mobility.       Start:  03/31/25    Expected End:  05/19/25            Patient will be achieve pain free gross bilateral MMT in order to increase tolerance for all functional transfers.       Start:  03/31/25    Expected End:  05/19/25            Pt will scores report a 55% or greater score on FOTO hip survey  to demonstrate increase in LE function with every day tasks.        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       Start:  03/31/25    Expected End:  05/19/25               Short-term goals       Pt to improve bilateral hip and knee gross range of motion by 50% to allow for improved functional mobility to allow for improvement in IADL's.        Start:  03/31/25    Expected End:  04/28/25             Increased bilateral hip internal rotation  MMT 1/2  to increase tolerance for ADL and work activities.       Start:  03/31/25    Expected End:  04/28/25            Pt to tolerate HEP to improve ROM and independence with ADL's.       Start:  03/31/25    Expected End:  04/28/25            Report decreased in pain at worse less than  <   / =   7/10   to increase tolerance for functional mobility.       Start:  03/31/25    Expected End:  04/28/25            Patient will be able to walk at least 200 ft. with no greater than 5/10 bilateral knee pain in order to increase tolerance to work-related duties.       Start:  03/31/25    Expected End:  04/28/25                Jess Weiner, PT

## 2025-04-03 ENCOUNTER — CLINICAL SUPPORT (OUTPATIENT)
Dept: REHABILITATION | Facility: OTHER | Age: 55
End: 2025-04-03
Payer: COMMERCIAL

## 2025-04-03 DIAGNOSIS — M17.0 PRIMARY OSTEOARTHRITIS OF BOTH KNEES: Primary | ICD-10-CM

## 2025-04-03 DIAGNOSIS — Z98.890 HISTORY OF ARTHROSCOPY OF RIGHT KNEE: ICD-10-CM

## 2025-04-03 PROCEDURE — 97110 THERAPEUTIC EXERCISES: CPT | Mod: CQ

## 2025-04-03 NOTE — PROGRESS NOTES
"  Outpatient Rehab    Physical Therapy Visit    Patient Name: Barthelemy Jolly  MRN: [id number]  YOB: 1970  Encounter Date: 4/3/2025    Therapy Diagnosis: No diagnosis found.  Physician: Angel Palomo NP    Physician Orders: Eval and Treat  Medical Diagnosis: Primary osteoarthritis of both knees  History of arthroscopy of right knee    Visit # / Visits Authorized:  1 / 10  Insurance Authorization Period: 4/2/2025 to 12/31/2025  Date of Evaluation: 3/31/2025  Plan of Care Certification: 3/31/2025 to 12/31/2025      PT/PTA: PTA   Number of PTA visits since last PT visit:1  Time In: 0130   Time Out: 0225  Total Time: 55   Total Billable Time: 55    FOTO:  Intake Score:  %  Survey Score 1:  %  Survey Score 2:  %         Subjective   He is hurting. Lately it has been hurting all the time..  Pain reported as 8/10.      Objective            Treatment:  Therapeutic Exercise  TE 1: Recumbent bike x 6'  TE 2: Seated HSS 2 x 1' ea, Bilateral prone quad stretch c/ strap 2 x 1'  TE 3: Calf raise 3 x 12, Slant board stretch 3 x 30"  TE 4: Bilateral quad stretch 2 x 1' ea  TE 5: SL hip abd no wt 2 x 10 ea  TE 6: Bridges 3 x 10  TE 7: MedX knee curls 90# x 30, Knee extension x 10 both legs, x 10 ea with 2 legs up and 1 leg down  TE 8: Leg press # 3 x 10  , # x 20 ea  Balance/Neuromuscular Re-Education  NMR 1: SL balance  x 1' ea  Therapeutic Activity  TA 1: Seated Tailgaters for pain relief 9# ea leg x 3'    Time Entry(in minutes):  Neuromuscular Re-Education Time Entry: 3  Therapeutic Activity Time Entry: 3  Therapeutic Exercise Time Entry: 49    Assessment & Plan   Assessment: Pt presents to first follow up with high levels of pain. Reviewed HEP with pt requiring mod cues. Encouraged pt to perform more regularly to optimize therapeutic outcomes. Introduced additional glut and quad strengthening without provocation. Pt reports decreased pain following session.  Evaluation/Treatment Tolerance: " Patient tolerated treatment well    Patient will continue to benefit from skilled outpatient physical therapy to address the deficits listed in the problem list box on initial evaluation, provide pt/family education and to maximize pt's level of independence in the home and community environment.     Patient's spiritual, cultural, and educational needs considered and patient agreeable to plan of care and goals.           Plan: Continue POC    Goals:   Active       Long-term goals       Report decreased in pain at worse less than  <   / =  5/10  to increase tolerance for functional mobility.       Start:  03/31/25    Expected End:  05/19/25            Patient will be achieve pain free gross bilateral MMT in order to increase tolerance for all functional transfers.       Start:  03/31/25    Expected End:  05/19/25            Pt will scores report a 55% or greater score on FOTO hip survey  to demonstrate increase in LE function with every day tasks.        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       Start:  03/31/25    Expected End:  05/19/25               Short-term goals       Pt to improve bilateral hip and knee gross range of motion by 50% to allow for improved functional mobility to allow for improvement in IADL's.  (Progressing)       Start:  03/31/25    Expected End:  04/28/25             Increased bilateral hip internal rotation  MMT 1/2  to increase tolerance for ADL and work activities.       Start:  03/31/25    Expected End:  04/28/25            Pt to tolerate HEP to improve ROM and independence with ADL's.       Start:  03/31/25    Expected End:  04/28/25            Report decreased in pain at worse less than  <   / =   7/10  to increase tolerance for functional mobility.       Start:  03/31/25     Expected End:  04/28/25            Patient will be able to walk at least 200 ft. with no greater than 5/10 bilateral knee pain in order to increase tolerance to work-related duties.       Start:  03/31/25    Expected End:  04/28/25                Bao Hirsch PTA

## 2025-04-10 ENCOUNTER — CLINICAL SUPPORT (OUTPATIENT)
Dept: REHABILITATION | Facility: OTHER | Age: 55
End: 2025-04-10
Payer: COMMERCIAL

## 2025-04-10 DIAGNOSIS — Z98.890 HISTORY OF ARTHROSCOPY OF RIGHT KNEE: ICD-10-CM

## 2025-04-10 DIAGNOSIS — M17.0 PRIMARY OSTEOARTHRITIS OF BOTH KNEES: Primary | ICD-10-CM

## 2025-04-10 PROCEDURE — 97110 THERAPEUTIC EXERCISES: CPT | Mod: CQ

## 2025-04-10 NOTE — PROGRESS NOTES
"  Outpatient Rehab    Physical Therapy Visit    Patient Name: Barthelemy Jolly  MRN: [id number]  YOB: 1970  Encounter Date: 4/10/2025    Therapy Diagnosis: No diagnosis found.  Physician: Angel Palomo NP    Physician Orders: Eval and Treat  Medical Diagnosis: Primary osteoarthritis of both knees  History of arthroscopy of right knee    Visit # / Visits Authorized:  2 / 10  Insurance Authorization Period: 4/2/2025 to 12/31/2025  Date of Evaluation: 3/31/2025  Plan of Care Certification: 3/31/2025 to 12/31/2025      PT/PTA: PTA   Number of PTA visits since last PT visit:2  Time In: 1245   Time Out: 1330  Total Time: 45   Total Billable Time: 45    FOTO:  Intake Score:  %  Survey Score 1:  %  Survey Score 2:  %         Subjective   His knees are hurting. They felt more flexible after last visit..  Pain reported as 7/10.      Objective            Treatment:  Therapeutic Exercise  TE 1: Recumbent bike x 6'  TE 2: Seated HSS 2 x 1' ea, Bilateral prone quad stretch c/ strap 2 x 1'  TE 3: Calf raise 3 x 12, Slant board stretch 3 x 30"  TE 4: Bilateral quad stretch 2 x 1' ea  TE 5: SL hip abd no wt 2 x 10 ea- NP  TE 6: Bridges 3 x 10  TE 7: MedX knee curls 90# x 30, Knee extension x 10 both legs, x 10 ea with 2 legs up and 1 leg down  TE 8: Leg press # 3 x 10  , # x 20 ea  Balance/Neuromuscular Re-Education  NMR 1: SL balance  x 1' ea -NP  NMR 2: Supine hip flexion GTB 3 x 12  NMR 3: DL heel raises 3 x 12    Time Entry(in minutes):  Neuromuscular Re-Education Time Entry: 3  Therapeutic Exercise Time Entry: 42    Assessment & Plan   Assessment: Pt presents with symptoms unchanged. Pt endorses symptoms relief following therapy though symptoms return. Continued emphasis on quad and glut strengthening without issue.  Evaluation/Treatment Tolerance: Patient tolerated treatment well    Patient will continue to benefit from skilled outpatient physical therapy to address the deficits listed in the " problem list box on initial evaluation, provide pt/family education and to maximize pt's level of independence in the home and community environment.     Patient's spiritual, cultural, and educational needs considered and patient agreeable to plan of care and goals.           Plan:      Goals:   Active       Long-term goals       Report decreased in pain at worse less than  <   / =  5/10  to increase tolerance for functional mobility.       Start:  03/31/25    Expected End:  05/19/25            Patient will be achieve pain free gross bilateral MMT in order to increase tolerance for all functional transfers.       Start:  03/31/25    Expected End:  05/19/25            Pt will scores report a 55% or greater score on FOTO hip survey  to demonstrate increase in LE function with every day tasks.        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       Start:  03/31/25    Expected End:  05/19/25               Short-term goals       Pt to improve bilateral hip and knee gross range of motion by 50% to allow for improved functional mobility to allow for improvement in IADL's.  (Progressing)       Start:  03/31/25    Expected End:  04/28/25             Increased bilateral hip internal rotation  MMT 1/2  to increase tolerance for ADL and work activities.       Start:  03/31/25    Expected End:  04/28/25            Pt to tolerate HEP to improve ROM and independence with ADL's.       Start:  03/31/25    Expected End:  04/28/25            Report decreased in pain at worse less than  <   / =   7/10  to increase tolerance for functional mobility.       Start:  03/31/25    Expected End:  04/28/25            Patient will be able to walk at least 200 ft. with no greater than 5/10 bilateral knee pain in order to increase tolerance  to work-related duties.       Start:  03/31/25    Expected End:  04/28/25                Bao Hirsch, PTA

## 2025-04-15 ENCOUNTER — CLINICAL SUPPORT (OUTPATIENT)
Dept: REHABILITATION | Facility: OTHER | Age: 55
End: 2025-04-15
Payer: COMMERCIAL

## 2025-04-15 DIAGNOSIS — M79.605 PAIN IN BOTH LOWER EXTREMITIES: ICD-10-CM

## 2025-04-15 DIAGNOSIS — R53.1 DECREASED STRENGTH: Primary | ICD-10-CM

## 2025-04-15 DIAGNOSIS — M79.604 PAIN IN BOTH LOWER EXTREMITIES: ICD-10-CM

## 2025-04-15 PROCEDURE — 97112 NEUROMUSCULAR REEDUCATION: CPT

## 2025-04-15 PROCEDURE — 97110 THERAPEUTIC EXERCISES: CPT

## 2025-04-15 NOTE — PROGRESS NOTES
"  Outpatient Rehab    Physical Therapy Visit    Patient Name: Barthelemy Jolly  MRN: [id number]  YOB: 1970  Encounter Date: 4/15/2025    Therapy Diagnosis: No diagnosis found.  Physician: Angel Palomo NP    Physician Orders: Eval and Treat  Medical Diagnosis: Primary osteoarthritis of both knees  History of arthroscopy of right knee    Visit # / Visits Authorized:  3 / 10  Insurance Authorization Period: 4/2/2025 to 12/31/2025  Date of Evaluation: 3/31/2025  Plan of Care Certification: 3/31/2025 to 12/31/2025      PT/PTA: PT   Number of PTA visits since last PT visit:0  Time In: 1300   Time Out: 1400  Total Time: 60   Total Billable Time: 55    FOTO:  Intake Score:  %  Survey Score 1:  %  Survey Score 2:  %         Subjective   Patient reports bilateral knee weakness and severe pain..  Pain reported as 8/10. Patient reports burning sensation in his quads during moderately resisted supine hip flexion.    Objective            Treatment:  Therapeutic Exercise  TE 1: Recumbent bike x 6'  TE 2: Seated HSS 2 x 1' ea, Bilateral prone quad stretch c/ strap 2 x 1'  TE 3: Calf raise 3 x 12, Slant board stretch 3 x 30"  TE 4: Bilateral quad stretch 2 x 1' ea  TE 5: SL hip abd no wt 2 x 10 ea  TE 6: Bridges 3 x 10  TE 7: MedX knee curls 90# x 30, Knee extension x 10 both legs, x 10 ea with 2 legs up and 1 leg down  TE 8: Leg press # 3 x 10  , # x 20 ea  Balance/Neuromuscular Re-Education  NMR 1: SL balance  x 1' ea  NMR 2: Supine hip flexion GTB 3 x 12  NMR 3: DL heel raises 3 x 12  Modalities  Cryotherapy (Minutes\Location): 5/ B knees    Time Entry(in minutes):  Hot/Cold Pack Time Entry: 5  Neuromuscular Re-Education Time Entry: 10  Therapeutic Exercise Time Entry: 45    Assessment & Plan   Assessment: Patient presents to therapy with severe bilateral knee pain and fatigue. Despite subjective, patient was able to perform exercises well without further exacerbation of symptoms. Will continue to " monitor and progress exercises as tolerated by pt to further address impairments and improve overall functional mobility.  Evaluation/Treatment Tolerance: Patient tolerated treatment well    Patient will continue to benefit from skilled outpatient physical therapy to address the deficits listed in the problem list box on initial evaluation, provide pt/family education and to maximize pt's level of independence in the home and community environment.     Patient's spiritual, cultural, and educational needs considered and patient agreeable to plan of care and goals.           Plan: Continue POC    Goals:   Active       Long-term goals       Report decreased in pain at worse less than  <   / =  5/10  to increase tolerance for functional mobility.       Start:  03/31/25    Expected End:  05/19/25            Patient will be achieve pain free gross bilateral MMT in order to increase tolerance for all functional transfers.       Start:  03/31/25    Expected End:  05/19/25            Pt will scores report a 55% or greater score on FOTO hip survey  to demonstrate increase in LE function with every day tasks.        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       Start:  03/31/25    Expected End:  05/19/25               Short-term goals       Pt to improve bilateral hip and knee gross range of motion by 50% to allow for improved functional mobility to allow for improvement in IADL's.  (Progressing)       Start:  03/31/25    Expected End:  04/28/25             Increased bilateral hip internal rotation  MMT 1/2  to increase tolerance for ADL and work activities.       Start:  03/31/25    Expected End:  04/28/25            Pt to tolerate HEP to improve ROM and independence with ADL's.       Start:  03/31/25    Expected End:   04/28/25            Report decreased in pain at worse less than  <   / =   7/10  to increase tolerance for functional mobility.       Start:  03/31/25    Expected End:  04/28/25            Patient will be able to walk at least 200 ft. with no greater than 5/10 bilateral knee pain in order to increase tolerance to work-related duties.       Start:  03/31/25    Expected End:  04/28/25                Lima Porras, PT

## 2025-04-25 ENCOUNTER — CLINICAL SUPPORT (OUTPATIENT)
Dept: REHABILITATION | Facility: OTHER | Age: 55
End: 2025-04-25
Payer: COMMERCIAL

## 2025-04-25 DIAGNOSIS — M79.604 PAIN IN BOTH LOWER EXTREMITIES: ICD-10-CM

## 2025-04-25 DIAGNOSIS — M79.605 PAIN IN BOTH LOWER EXTREMITIES: ICD-10-CM

## 2025-04-25 DIAGNOSIS — M17.0 PRIMARY OSTEOARTHRITIS OF BOTH KNEES: ICD-10-CM

## 2025-04-25 DIAGNOSIS — R53.1 DECREASED STRENGTH: Primary | ICD-10-CM

## 2025-04-25 PROCEDURE — 97110 THERAPEUTIC EXERCISES: CPT

## 2025-04-25 PROCEDURE — 97112 NEUROMUSCULAR REEDUCATION: CPT

## 2025-04-25 NOTE — PROGRESS NOTES
"  Outpatient Rehab    Physical Therapy Visit    Patient Name: Barthelemy Jolly  MRN: [id number]  YOB: 1970  Encounter Date: 4/25/2025    Therapy Diagnosis: No diagnosis found.  Physician: Angel Palomo NP    Physician Orders: Eval and Treat  Medical Diagnosis: Primary osteoarthritis of both knees  History of arthroscopy of right knee    Visit # / Visits Authorized:  4 / 10  Insurance Authorization Period: 4/2/2025 to 12/31/2025  Date of Evaluation: 3/31/2025  Plan of Care Certification: 3/31/2025 to 12/31/2025      PT/PTA: PT   Number of PTA visits since last PT visit:0  Time In: 1110 (late arrival)   Time Out: 1200  Total Time: 50   Total Billable Time: 45    FOTO:  Intake Score:  %  Survey Score 1:  %  Survey Score 2:  %         Subjective   Patient reports his L knee is "killing him" with severe 8/10 knee pain. However, he reports his R knee feels better..  Pain reported as 8/10.      Objective            Treatment:  Therapeutic Exercise  TE 1: Recumbent bike x 6'  TE 2: Seated HSS 2 x 1' ea, Bilateral prone quad stretch c/ strap 2 x 1'  TE 3: Calf raise 3 x 12, Slant board stretch 3 x 30"  TE 4: Bilateral quad stretch 2 x 1' ea  TE 5: SL hip abd no wt 2 x 10 ea  TE 6: Bridges 3 x 10  TE 7: MedX knee curls 90# x 30, Knee extension x 10 both legs, x 10 ea with 2 legs up and 1 leg down  TE 8: Leg press # 3 x 10  , # x 20 ea  Balance/Neuromuscular Re-Education  NMR 1: SL balance  x 1' ea  NMR 2: Supine hip flexion GTB 3 x 12  NMR 3: DL heel raises 3 x 12  Modalities  Cryotherapy (Minutes\Location): 5/ B knees    Time Entry(in minutes):  Hot/Cold Pack Time Entry: 5  Neuromuscular Re-Education Time Entry: 10  Therapeutic Exercise Time Entry: 35    Assessment & Plan   Assessment: Patient presents to therapy with severe L knee pain;  however, improved symptoms with R knee. Despite subjective, patient was able to perform exercises well without further exacerbation of symptoms. Will " continue to monitor and progress exercises as tolerated by pt to further address impairments and improve overall functional mobility.  Evaluation/Treatment Tolerance: Patient tolerated treatment well    Patient will continue to benefit from skilled outpatient physical therapy to address the deficits listed in the problem list box on initial evaluation, provide pt/family education and to maximize pt's level of independence in the home and community environment.     Patient's spiritual, cultural, and educational needs considered and patient agreeable to plan of care and goals.           Plan: Continue POC    Goals:   Active       Long-term goals       Report decreased in pain at worse less than  <   / =  5/10  to increase tolerance for functional mobility.       Start:  03/31/25    Expected End:  05/19/25            Patient will be achieve pain free gross bilateral MMT in order to increase tolerance for all functional transfers.       Start:  03/31/25    Expected End:  05/19/25            Pt will scores report a 55% or greater score on FOTO hip survey  to demonstrate increase in LE function with every day tasks.        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       Start:  03/31/25    Expected End:  05/19/25               Short-term goals       Pt to improve bilateral hip and knee gross range of motion by 50% to allow for improved functional mobility to allow for improvement in IADL's.  (Progressing)       Start:  03/31/25    Expected End:  04/28/25             Increased bilateral hip internal rotation  MMT 1/2  to increase tolerance for ADL and work activities.       Start:  03/31/25    Expected End:  04/28/25            Pt to tolerate HEP to improve ROM and independence with ADL's.       Start:  03/31/25     Expected End:  04/28/25            Report decreased in pain at worse less than  <   / =   7/10  to increase tolerance for functional mobility.       Start:  03/31/25    Expected End:  04/28/25            Patient will be able to walk at least 200 ft. with no greater than 5/10 bilateral knee pain in order to increase tolerance to work-related duties.       Start:  03/31/25    Expected End:  04/28/25                Lima Porras, PT

## 2025-05-06 ENCOUNTER — CLINICAL SUPPORT (OUTPATIENT)
Dept: REHABILITATION | Facility: OTHER | Age: 55
End: 2025-05-06
Payer: COMMERCIAL

## 2025-05-06 DIAGNOSIS — R53.1 DECREASED STRENGTH: Primary | ICD-10-CM

## 2025-05-06 PROCEDURE — 97110 THERAPEUTIC EXERCISES: CPT

## 2025-05-06 PROCEDURE — 97530 THERAPEUTIC ACTIVITIES: CPT

## 2025-05-06 NOTE — PROGRESS NOTES
"  Outpatient Rehab    Physical Therapy Progress Note    Patient Name: Barthelemy Jolly  MRN: [id number]  YOB: 1970  Encounter Date: 5/6/2025    Therapy Diagnosis: No diagnosis found.  Physician: Angel Palomo NP    Physician Orders: Eval and Treat  Medical Diagnosis: Primary osteoarthritis of both knees  History of arthroscopy of right knee    Visit # / Visits Authorized:  5 / 10  Insurance Authorization Period: 4/2/2025 to 12/31/2025  Date of Evaluation: 3/31/2025     PT/PTA:  PT  Number of PTA visits since last PT visit: NA  Time In: 1300   Time Out: 1400  Total Time (in minutes): 60   Total Billable Time (in minutes): 55    FOTO:  Intake Score: 31 %  Survey Score 2: 31 %  Survey Score 3:  %         Subjective   Patient reports that his knees are still bothering him a lot and feels like his knees are not really getting better. He still has issues with feeling like his knees are going to hyperextend when he is walking and going to standing..         Objective        Step up 6 inch: not able to perform without UE support to help control hyperextension  Step down 4 inch: not able to perform without UE support to help control quad  Sit<>stand: able to perform without UE support, but increase in pain along with decreased confidence     Treatment:  Therapeutic Exercise  TE 1: Recumbent bike x 6' for joint nutrition  TE 2: Seated HSS 2 x 1' ea, Bilateral prone quad stretch c/ strap 2 x 1'  TE 3: Slant board stretch 3 x 30"  TE 4: Seated tailgaters 5# x 2 minutes on each leg  TE 6: Bridges 3 x 10  TE 8: Leg press # 3 x 10  , # x 20 ea  TE 9: Standing TKE facing away from red thick band x 15 reps 5 second holds each side- increase in symptoms after performing  Therapeutic Activity  TA 1: Reassessment  TA 2: HEP review  TA 3: Step ups 6 inch with UE support 2 x 10 3 second holds at the top  TA 4: Step downs 4 inch with B UE support 2 x 5 reps  Modalities  Cryotherapy (Minutes\Location): 5/ B " knees    Time Entry(in minutes):       Assessment & Plan   Assessment: Patient is still having significant pain to B knees, decreased B quad control, decreased core/hip/LE strength, and decreased tolerance to activity. Patient's symptoms seem to be most consistent with more hypermobility of B knee joints and decreased ability/fear of quad control. Patient needs continued quad control, strengthening, and pain control in order to improve quality of life and return to PLOF.       Patient will continue to benefit from skilled outpatient physical therapy to address the deficits listed in the problem list box on initial evaluation, provide pt/family education and to maximize pt's level of independence in the home and community environment.     Patient's spiritual, cultural, and educational needs considered and patient agreeable to plan of care and goals.           Plan: Continue POC with heightened focus on quad control and SL work    Goals:   Active       Long-term goals       Report decreased in pain at worse less than  <   / =  5/10  to increase tolerance for functional mobility. (Progressing)       Start:  03/31/25    Expected End:  05/19/25            Patient will be achieve pain free gross bilateral MMT in order to increase tolerance for all functional transfers. (Progressing)       Start:  03/31/25    Expected End:  05/19/25            Pt will scores report a 55% or greater score on FOTO hip survey  to demonstrate increase in LE function with every day tasks.  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 (Progressing)       Start:  03/31/25    Expected End:  05/19/25               Short-term goals       Pt to improve bilateral hip and knee gross range of motion by 50% to allow for improved  functional mobility to allow for improvement in IADL's.  (Met)       Start:  03/31/25    Expected End:  04/28/25    Resolved:  05/07/25          Increased bilateral hip internal rotation  MMT 1/2  to increase tolerance for ADL and work activities. (Progressing)       Start:  03/31/25    Expected End:  04/28/25            Pt to tolerate HEP to improve ROM and independence with ADL's. (Progressing)       Start:  03/31/25    Expected End:  04/28/25            Report decreased in pain at worse less than  <   / =   7/10  to increase tolerance for functional mobility. (Progressing)       Start:  03/31/25    Expected End:  04/28/25            Patient will be able to walk at least 200 ft. with no greater than 5/10 bilateral knee pain in order to increase tolerance to work-related duties. (Progressing)       Start:  03/31/25    Expected End:  04/28/25                Kb Henley, PT

## 2025-05-13 ENCOUNTER — CLINICAL SUPPORT (OUTPATIENT)
Dept: REHABILITATION | Facility: OTHER | Age: 55
End: 2025-05-13
Payer: COMMERCIAL

## 2025-05-13 DIAGNOSIS — R53.1 DECREASED STRENGTH: Primary | ICD-10-CM

## 2025-05-13 PROBLEM — M54.9 BACK PAIN: Status: RESOLVED | Noted: 2021-04-14 | Resolved: 2025-05-13

## 2025-05-13 PROBLEM — M53.86 DECREASED ROM OF LUMBAR SPINE: Status: RESOLVED | Noted: 2021-04-14 | Resolved: 2025-05-13

## 2025-05-13 PROCEDURE — 97530 THERAPEUTIC ACTIVITIES: CPT

## 2025-05-13 PROCEDURE — 97110 THERAPEUTIC EXERCISES: CPT

## 2025-05-13 NOTE — PROGRESS NOTES
Outpatient Rehab    Physical Therapy Visit    Patient Name: Barthelemy Jolly  MRN: [id number]  YOB: 1970  Encounter Date: 5/13/2025    Therapy Diagnosis: No diagnosis found.  Physician: Angel Palomo NP    Physician Orders: Eval and Treat  Medical Diagnosis: Primary osteoarthritis of both knees  History of arthroscopy of right knee    Visit # / Visits Authorized:  6 / 10  Insurance Authorization Period: 4/2/2025 to 12/31/2025  Date of Evaluation: 3/25/2025  3/31/2025  Plan of Care Certification: 3/31/2025 to 5/19/2025      PT/PTA:  PT  Number of PTA visits since last PT visit: NA  Time In: 1405   Time Out: 1500  Total Time (in minutes): 55   Total Billable Time (in minutes): 55    Subjective   Patient reports that he has been feeling a little better overall. Less pain with more activities..  Pain reported as 6/10.      Objective            Treatment:  Therapeutic Exercise  TE 1: Recumbent bike x 6' for joint nutrition  TE 2: Bilateral prone quad stretch c/ strap 2 x 1'  TE 3: 2 rounds: TRX band squats x 10 reps with G TB and x 10 reps glut med 3 second holds G TB  TE 4: Prone TKE x 2 minutes 5 second holds  TE 5: SLR + hip AB 2 x 8 reps with B TB  TE 6: Bridges x 2 minutes 10 second holds + B TB  TE 8: # 2 x 12 reps on each side  TE 9: Standing TKE facing away from blue band x 10 reps 5 second holds each side and x 10 reps facing towards band 5 second holds- given for HEP but check next visit for form  Therapeutic Activity  TA 2: HEP review  TA 3: Step ups 6 inch with UE support 2 x 10 3 second holds at the top  TA 4: Step downs 4 inch with B UE support 2 x 5 reps      Time Entry(in minutes):       Assessment & Plan   Assessment: Patient demonstrated good tolerance to more quad control today and hip/LE strengthening. Good demonstrated of added standing TKE for home.       Patient will continue to benefit from skilled outpatient physical therapy to address the deficits listed in the problem  list box on initial evaluation, provide pt/family education and to maximize pt's level of independence in the home and community environment.     Patient's spiritual, cultural, and educational needs considered and patient agreeable to plan of care and goals.           Plan: Continue POC with heightened focus on quad control and SL work    Goals:   Active       Long-term goals       Report decreased in pain at worse less than  <   / =  5/10  to increase tolerance for functional mobility. (Progressing)       Start:  03/31/25    Expected End:  05/19/25            Patient will be achieve pain free gross bilateral MMT in order to increase tolerance for all functional transfers. (Progressing)       Start:  03/31/25    Expected End:  05/19/25            Pt will scores report a 55% or greater score on FOTO hip survey  to demonstrate increase in LE function with every day tasks.  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  (Met)       Start:  03/31/25    Expected End:  04/28/25    Resolved:  05/07/25          Increased bilateral hip internal rotation  MMT 1/2  to increase tolerance for ADL and work activities. (Progressing)       Start:  03/31/25    Expected End:  04/28/25            Pt to tolerate HEP to improve ROM and independence with ADL's. (Progressing)       Start:  03/31/25    Expected End:  04/28/25            Report decreased in pain at worse less than  <   / =   7/10  to increase tolerance for functional mobility. (Progressing)       Start:  03/31/25     Expected End:  04/28/25            Patient will be able to walk at least 200 ft. with no greater than 5/10 bilateral knee pain in order to increase tolerance to work-related duties. (Progressing)       Start:  03/31/25    Expected End:  04/28/25                Kb Henley, PT

## 2025-05-20 ENCOUNTER — CLINICAL SUPPORT (OUTPATIENT)
Dept: REHABILITATION | Facility: OTHER | Age: 55
End: 2025-05-20
Payer: COMMERCIAL

## 2025-05-20 DIAGNOSIS — Z98.890 HISTORY OF ARTHROSCOPY OF RIGHT KNEE: ICD-10-CM

## 2025-05-20 DIAGNOSIS — M79.604 PAIN IN BOTH LOWER EXTREMITIES: ICD-10-CM

## 2025-05-20 DIAGNOSIS — M79.605 PAIN IN BOTH LOWER EXTREMITIES: ICD-10-CM

## 2025-05-20 DIAGNOSIS — R53.1 DECREASED STRENGTH: Primary | ICD-10-CM

## 2025-05-20 DIAGNOSIS — M17.0 PRIMARY OSTEOARTHRITIS OF BOTH KNEES: ICD-10-CM

## 2025-05-20 PROCEDURE — 97110 THERAPEUTIC EXERCISES: CPT | Mod: CQ

## 2025-05-20 NOTE — PROGRESS NOTES
Outpatient Rehab    Physical Therapy Visit    Patient Name: Barthelemy Jolly  MRN: [id number]  YOB: 1970  Encounter Date: 5/20/2025    Therapy Diagnosis: No diagnosis found.  Physician: Angel Palomo NP    Physician Orders: Eval and Treat  Medical Diagnosis: Primary osteoarthritis of both knees  History of arthroscopy of right knee    Visit # / Visits Authorized:  7 / 10  Insurance Authorization Period: 4/2/2025 to 12/31/2025  Date of Evaluation: 3/25/2025  3/31/2025  Plan of Care Certification: 3/31/2025 to 5/19/2025      PT/PTA: PTAPT  Number of PTA visits since last PT visit:1NA  Time In: 0100   Time Out: 0153  Total Time (in minutes): 53   Total Billable Time (in minutes): 53    Subjective   HIs left knee is the worst. He will sometimes getting tingling down that leg. Sometimes the R knee hurts but the left is so painful he doesn't notice..  Pain reported as 7/10.      Objective            Treatment:  Therapeutic Exercise  TE 1: Recumbent bike x 6' for joint nutrition  TE 2: Bilateral prone quad stretch c/ strap 2 x 1'  TE 3: 2 rounds: TRX band squats x 10 reps with G TB and x 10 reps glut med 3 second holds G TB  TE 4: Prone TKE x 2 minutes 5 second holds  TE 5: SLR + hip AB 2 x 8 reps with B TB  TE 6: Bridges x 2 minutes 10 second holds + B TB  TE 8: # 2 x 12 reps on each side  TE 9: Standing TKE facing away from blue band x 10 reps 5 second holds each side and x 10 reps facing towards band 5 second holds        Time Entry(in minutes):  Therapeutic Exercise Time Entry: 53    Assessment & Plan   Assessment: Pt presents with mod pain levels. Continued previously prescribed without further issue. Pt reported pain down posterior lateral thigh pt demonstrating possible nueral tension in L LE.  Evaluation/Treatment Tolerance: Patient tolerated treatment well    Patient will continue to benefit from skilled outpatient physical therapy to address the deficits listed in the problem list box  on initial evaluation, provide pt/family education and to maximize pt's level of independence in the home and community environment.     Patient's spiritual, cultural, and educational needs considered and patient agreeable to plan of care and goals.           Plan: Continue POC with heightened focus on quad control and SL work    Goals:   Active       Long-term goals       Report decreased in pain at worse less than  <   / =  5/10  to increase tolerance for functional mobility. (Progressing)       Start:  03/31/25    Expected End:  05/19/25            Patient will be achieve pain free gross bilateral MMT in order to increase tolerance for all functional transfers. (Progressing)       Start:  03/31/25    Expected End:  05/19/25            Pt will scores report a 55% or greater score on FOTO hip survey  to demonstrate increase in LE function with every day tasks.  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  (Met)       Start:  03/31/25    Expected End:  04/28/25    Resolved:  05/07/25          Increased bilateral hip internal rotation  MMT 1/2  to increase tolerance for ADL and work activities. (Progressing)       Start:  03/31/25    Expected End:  04/28/25            Pt to tolerate HEP to improve ROM and independence with ADL's. (Progressing)       Start:  03/31/25    Expected End:  04/28/25            Report decreased in pain at worse less than  <   / =   7/10  to increase tolerance for functional mobility. (Progressing)       Start:  03/31/25     Expected End:  04/28/25            Patient will be able to walk at least 200 ft. with no greater than 5/10 bilateral knee pain in order to increase tolerance to work-related duties. (Progressing)       Start:  03/31/25    Expected End:  04/28/25                Bao Hirsch PTA